# Patient Record
Sex: MALE | Race: BLACK OR AFRICAN AMERICAN | NOT HISPANIC OR LATINO | Employment: FULL TIME | URBAN - METROPOLITAN AREA
[De-identification: names, ages, dates, MRNs, and addresses within clinical notes are randomized per-mention and may not be internally consistent; named-entity substitution may affect disease eponyms.]

---

## 2021-03-15 PROBLEM — G97.1 HEADACHE, SPINAL, POSTOPERATIVE: Status: ACTIVE | Noted: 2021-03-15

## 2021-03-16 PROBLEM — L03.115 CELLULITIS OF RIGHT LOWER EXTREMITY: Status: ACTIVE | Noted: 2021-03-16

## 2021-03-16 PROBLEM — R79.89 ELEVATED TROPONIN: Status: ACTIVE | Noted: 2021-03-16

## 2021-03-16 PROBLEM — E11.9 TYPE II DIABETES MELLITUS: Status: ACTIVE | Noted: 2021-03-16

## 2021-03-16 PROBLEM — I16.0 HYPERTENSIVE URGENCY: Status: ACTIVE | Noted: 2021-03-16

## 2021-03-16 PROBLEM — Z96.651 STATUS POST TOTAL RIGHT KNEE REPLACEMENT: Status: ACTIVE | Noted: 2021-03-16

## 2021-03-16 PROBLEM — E87.6 HYPOKALEMIA: Status: ACTIVE | Noted: 2021-03-16

## 2021-03-17 PROBLEM — G97.1 POSTOPERATIVE SPINAL HEADACHE: Status: ACTIVE | Noted: 2021-03-17

## 2021-03-17 PROBLEM — G97.1 POSTOPERATIVE SPINAL HEADACHE: Status: RESOLVED | Noted: 2021-03-15 | Resolved: 2021-03-17

## 2021-03-17 PROBLEM — E87.6 HYPOKALEMIA: Status: RESOLVED | Noted: 2021-03-16 | Resolved: 2021-03-17

## 2021-03-17 PROBLEM — I16.0 HYPERTENSIVE URGENCY: Status: RESOLVED | Noted: 2021-03-16 | Resolved: 2021-03-17

## 2021-03-17 PROBLEM — R79.89 ELEVATED TROPONIN: Status: RESOLVED | Noted: 2021-03-16 | Resolved: 2021-03-17

## 2022-01-07 ENCOUNTER — CLINICAL SUPPORT (OUTPATIENT)
Dept: PRIMARY CARE CLINIC | Facility: CLINIC | Age: 42
End: 2022-01-07
Payer: MEDICAID

## 2022-01-07 DIAGNOSIS — R05.9 COUGH: Primary | ICD-10-CM

## 2022-01-07 LAB
CTP QC/QA: YES
SARS-COV-2 RDRP RESP QL NAA+PROBE: POSITIVE

## 2022-01-07 PROCEDURE — U0002 COVID-19 LAB TEST NON-CDC: HCPCS | Mod: PBBFAC,PN

## 2022-01-07 NOTE — PROGRESS NOTES
Pt arrived to clinic with his wife. Verified ID using name and . Allergies verified. Pt. Swabbed for COVID 19 as his wife is also positive and symptomatic. Notified to contact his PCP for further instructions as far as treatment.

## 2022-01-12 DIAGNOSIS — U07.1 COVID-19 VIRUS DETECTED: ICD-10-CM

## 2023-06-05 ENCOUNTER — TELEPHONE (OUTPATIENT)
Dept: PAIN MEDICINE | Facility: CLINIC | Age: 43
End: 2023-06-05
Payer: MEDICAID

## 2023-06-05 NOTE — TELEPHONE ENCOUNTER
Spoke with pt. Advised pt that he will come in at 9am on Thursday. All questions answered. Pt verbalized understanding.

## 2023-06-05 NOTE — TELEPHONE ENCOUNTER
----- Message from Jimi Bright sent at 6/5/2023 11:29 AM CDT -----  Regarding: Returning Call  .Type:  Patient Returning Call    Who Called: Self     Who Left Message for Patient: Nurse    Does the patient know what this is regarding?: Appt for tomorrow.    Would the patient rather a call back or a response via [facility]? Call back    Best Call Back Number: [phone]     Additional Information: Called back to confirm appt for Thursday at the [facility] with this provider at 9:45 am. Please advise.

## 2023-06-08 ENCOUNTER — OFFICE VISIT (OUTPATIENT)
Dept: PAIN MEDICINE | Facility: CLINIC | Age: 43
End: 2023-06-08
Payer: MEDICAID

## 2023-06-08 VITALS
HEART RATE: 83 BPM | HEIGHT: 76 IN | DIASTOLIC BLOOD PRESSURE: 111 MMHG | BODY MASS INDEX: 36.51 KG/M2 | SYSTOLIC BLOOD PRESSURE: 179 MMHG | WEIGHT: 299.81 LBS

## 2023-06-08 DIAGNOSIS — M47.816 LUMBAR SPONDYLOSIS: ICD-10-CM

## 2023-06-08 DIAGNOSIS — M54.30 ACUTE SCIATICA: ICD-10-CM

## 2023-06-08 DIAGNOSIS — M51.36 DDD (DEGENERATIVE DISC DISEASE), LUMBAR: Primary | ICD-10-CM

## 2023-06-08 DIAGNOSIS — M54.16 LUMBAR RADICULOPATHY: ICD-10-CM

## 2023-06-08 DIAGNOSIS — S39.012A BACK STRAIN, INITIAL ENCOUNTER: ICD-10-CM

## 2023-06-08 PROBLEM — M51.369 DDD (DEGENERATIVE DISC DISEASE), LUMBAR: Status: ACTIVE | Noted: 2023-06-08

## 2023-06-08 PROCEDURE — 1159F MED LIST DOCD IN RCRD: CPT | Mod: CPTII,,, | Performed by: PAIN MEDICINE

## 2023-06-08 PROCEDURE — 99214 OFFICE O/P EST MOD 30 MIN: CPT | Mod: PBBFAC,PN | Performed by: PAIN MEDICINE

## 2023-06-08 PROCEDURE — 3080F PR MOST RECENT DIASTOLIC BLOOD PRESSURE >= 90 MM HG: ICD-10-PCS | Mod: CPTII,,, | Performed by: PAIN MEDICINE

## 2023-06-08 PROCEDURE — 3080F DIAST BP >= 90 MM HG: CPT | Mod: CPTII,,, | Performed by: PAIN MEDICINE

## 2023-06-08 PROCEDURE — 1159F PR MEDICATION LIST DOCUMENTED IN MEDICAL RECORD: ICD-10-PCS | Mod: CPTII,,, | Performed by: PAIN MEDICINE

## 2023-06-08 PROCEDURE — 99204 PR OFFICE/OUTPT VISIT, NEW, LEVL IV, 45-59 MIN: ICD-10-PCS | Mod: S$PBB,,, | Performed by: PAIN MEDICINE

## 2023-06-08 PROCEDURE — 3077F PR MOST RECENT SYSTOLIC BLOOD PRESSURE >= 140 MM HG: ICD-10-PCS | Mod: CPTII,,, | Performed by: PAIN MEDICINE

## 2023-06-08 PROCEDURE — 99999 PR PBB SHADOW E&M-EST. PATIENT-LVL IV: CPT | Mod: PBBFAC,,, | Performed by: PAIN MEDICINE

## 2023-06-08 PROCEDURE — 3008F PR BODY MASS INDEX (BMI) DOCUMENTED: ICD-10-PCS | Mod: CPTII,,, | Performed by: PAIN MEDICINE

## 2023-06-08 PROCEDURE — 99999 PR PBB SHADOW E&M-EST. PATIENT-LVL IV: ICD-10-PCS | Mod: PBBFAC,,, | Performed by: PAIN MEDICINE

## 2023-06-08 PROCEDURE — 1160F RVW MEDS BY RX/DR IN RCRD: CPT | Mod: CPTII,,, | Performed by: PAIN MEDICINE

## 2023-06-08 PROCEDURE — 99204 OFFICE O/P NEW MOD 45 MIN: CPT | Mod: S$PBB,,, | Performed by: PAIN MEDICINE

## 2023-06-08 PROCEDURE — 1160F PR REVIEW ALL MEDS BY PRESCRIBER/CLIN PHARMACIST DOCUMENTED: ICD-10-PCS | Mod: CPTII,,, | Performed by: PAIN MEDICINE

## 2023-06-08 PROCEDURE — 3008F BODY MASS INDEX DOCD: CPT | Mod: CPTII,,, | Performed by: PAIN MEDICINE

## 2023-06-08 PROCEDURE — 3077F SYST BP >= 140 MM HG: CPT | Mod: CPTII,,, | Performed by: PAIN MEDICINE

## 2023-06-08 RX ORDER — CETIRIZINE HYDROCHLORIDE 10 MG/1
TABLET ORAL
COMMUNITY

## 2023-06-08 RX ORDER — ATORVASTATIN CALCIUM 20 MG/1
20 TABLET, FILM COATED ORAL
COMMUNITY
Start: 2023-03-29

## 2023-06-08 RX ORDER — DEXTROAMPHETAMINE SACCHARATE, AMPHETAMINE ASPARTATE MONOHYDRATE, DEXTROAMPHETAMINE SULFATE AND AMPHETAMINE SULFATE 5; 5; 5; 5 MG/1; MG/1; MG/1; MG/1
20 CAPSULE, EXTENDED RELEASE ORAL EVERY MORNING
COMMUNITY

## 2023-06-08 RX ORDER — METFORMIN HYDROCHLORIDE 500 MG/1
500 TABLET ORAL
COMMUNITY
Start: 2023-01-11

## 2023-06-08 RX ORDER — LIRAGLUTIDE 6 MG/ML
1.8 INJECTION SUBCUTANEOUS
COMMUNITY

## 2023-06-08 RX ORDER — VALSARTAN AND HYDROCHLOROTHIAZIDE 320; 25 MG/1; MG/1
1 TABLET, FILM COATED ORAL
COMMUNITY
Start: 2023-01-11

## 2023-06-08 RX ORDER — METHYLPREDNISOLONE 4 MG/1
TABLET ORAL
Qty: 21 EACH | Refills: 0 | Status: SHIPPED | OUTPATIENT
Start: 2023-06-08 | End: 2023-06-29

## 2023-06-08 RX ORDER — GABAPENTIN 300 MG/1
300 CAPSULE ORAL 3 TIMES DAILY
Qty: 90 CAPSULE | Refills: 11 | Status: SHIPPED | OUTPATIENT
Start: 2023-06-08 | End: 2024-06-07

## 2023-06-08 RX ORDER — AMLODIPINE BESYLATE 10 MG/1
TABLET ORAL
COMMUNITY
End: 2023-06-08 | Stop reason: SDUPTHER

## 2023-06-08 NOTE — PROGRESS NOTES
Subjective:     Patient ID: Faraz Cole is a 42 y.o. male    Chief Complaint: Back Pain (Pt stated he had possibly pulled his back, pt went to ED and was told he has sciatica )      Referred by: Sue Amador NP      HPI:    Initial Encounter (6/8/23):  Faraz Cole is a 42 y.o. male who presents today with acute midline low back pain.  This pain has been present for 2-3 weeks.  Patient lifted a manhole cover while at work and began to have low back pain the next day.  Prior to this he was not having any pain.  Patient does have history of previous episodes of low back pain including sciatica.  Currently, his pain is located the midline lower lumbar spine.  Will radiate to the bilateral lumbar paraspinals, hips, buttocks.  Pain will radiate further into the bilateral inguinal region and genitals.  He denies any associated , weakness, bowel bladder dysfunction.  Does have some intermittent tingling of the toes of his right foot.  Pain is improved with standing and walking.  Typically worse when standing from sitting or changing positions while lying down.   This pain is described in detail below.    Physical Therapy:  No.    Non-pharmacologic Treatment:  Rest helps         TENS?  No    Pain Medications:         Currently taking:  Ibuprofen, gabapentin    Has tried in the past:      Has not tried:  Opioids, Tylenol, Muscle relaxants, TCAs, SNRIs, topical creams    Blood thinners:  None    Interventional Therapies:  None    Relevant Surgeries:  None    Affecting sleep?  Yes    Affecting daily activities? yes    Depressive symptoms? No          SI/HI? No    Work status: Employed    Pain Scores:    Best:       10/10  Worst:     10/10  Usually:   10/10  Today:    10/10    Pain Disability Index  Family/Home Responsibilities:: 10  Recreation:: 10  Social Activity:: 10  Occupation:: 10  Sexual Behavior:: 10  Self Care:: 10  Life-Support Activities:: 10  Pain Disability Index (PDI): 70    Review of Systems    Constitutional:  Negative for activity change, appetite change, chills, fatigue, fever and unexpected weight change.   HENT:  Negative for hearing loss.    Eyes:  Negative for visual disturbance.   Respiratory:  Negative for chest tightness and shortness of breath.    Cardiovascular:  Negative for chest pain.   Gastrointestinal:  Negative for abdominal pain, constipation, diarrhea, nausea and vomiting.   Genitourinary:  Negative for difficulty urinating.   Musculoskeletal:  Positive for back pain, gait problem and myalgias. Negative for neck pain.   Skin:  Negative for rash.   Neurological:  Positive for numbness. Negative for dizziness, weakness, light-headedness and headaches.   Psychiatric/Behavioral:  Positive for sleep disturbance. Negative for hallucinations and suicidal ideas. The patient is not nervous/anxious.      Past Medical History:   Diagnosis Date    Diabetes mellitus     Hypertension     Lumbar spondylosis 6/8/2023    Morbid obesity     Type II diabetes mellitus        Past Surgical History:   Procedure Laterality Date    APPENDECTOMY      CHOLECYSTECTOMY      KNEE ARTHROSCOPY Bilateral     TOTAL KNEE ARTHROPLASTY Right 03/11/2021    Dr. Kennedy Melendez       Social History     Socioeconomic History    Marital status:    Tobacco Use    Smoking status: Former     Types: Cigarettes    Smokeless tobacco: Never   Substance and Sexual Activity    Alcohol use: Not Currently     Comment: socially    Drug use: Never    Sexual activity: Never   Social History Narrative    ** Merged History Encounter **         ** Merged History Encounter **         ** Merged History Encounter **            Review of patient's allergies indicates:   Allergen Reactions    Ace inhibitors Swelling     Throat swelling     Ace inhibitors Swelling, Anaphylaxis and Rash     angioedema  Throat swelling   Angioedema  rash    Ace inhibitors Edema     Angioedema    Ace inhibitors Rash     rash       Current Outpatient Medications  on File Prior to Visit   Medication Sig Dispense Refill    atorvastatin (LIPITOR) 20 MG tablet Take 20 mg by mouth.      cetirizine (ZYRTEC) 10 MG tablet cetirizine 10 mg tablet   TAKE 1 TABLET BY MOUTH ONCE DAILY      dextroamphetamine-amphetamine (ADDERALL XR) 20 MG 24 hr capsule Take 20 mg by mouth every morning.      famotidine (PEPCID) 20 MG tablet Take 1 tablet (20 mg total) by mouth 2 (two) times daily as needed for Heartburn. 60 tablet 0    liraglutide 0.6 mg/0.1 mL, 18 mg/3 mL, subq PNIJ (VICTOZA 2-MATT) 0.6 mg/0.1 mL (18 mg/3 mL) PnIj pen 1.8 mg.      metFORMIN (GLUCOPHAGE) 500 MG tablet Take 500 mg by mouth.      valsartan-hydrochlorothiazide (DIOVAN-HCT) 320-25 mg per tablet Take 1 tablet by mouth.      [DISCONTINUED] acetaminophen (TYLENOL) 500 MG tablet Take 2 tablets (1,000 mg total) by mouth every 8 (eight) hours as needed for Pain. 60 tablet 0    [DISCONTINUED] albuterol (PROVENTIL/VENTOLIN HFA) 90 mcg/actuation inhaler Inhale 1-2 puffs into the lungs every 6 (six) hours as needed for Wheezing or Shortness of Breath. Rescue 18 g 0    [DISCONTINUED] alprazolam (XANAX) 1 MG tablet Take 1 mg by mouth 2 (two) times daily.      [DISCONTINUED] amLODIPine (NORVASC) 10 MG tablet amlodipine 10 mg tablet   TAKE 1 TABLET BY MOUTH ONCE DAILY IN THE EVENING FOR 30 DAYS      [DISCONTINUED] amoxicillin-clavulanate 875-125mg (AUGMENTIN) 875-125 mg per tablet Take 1 tablet by mouth 2 (two) times daily. 14 tablet 0    [DISCONTINUED] aspirin (ECOTRIN) 81 MG EC tablet Take 81 mg by mouth once daily.      [DISCONTINUED] aspirin (ECOTRIN) 81 MG EC tablet Take 81 mg by mouth once daily.      [DISCONTINUED] azithromycin (Z-MATT) 250 MG tablet Take 1 tablet (250 mg total) by mouth once daily. Take first 2 tablets together, then 1 every day until finished. 6 tablet 0    [DISCONTINUED] famotidine (PEPCID) 20 MG tablet Take 20 mg by mouth 2 (two) times daily.      [DISCONTINUED] gabapentin (NEURONTIN) 300 MG capsule Take 1  capsule (300 mg total) by mouth 3 (three) times daily. 90 capsule 11    [DISCONTINUED] hydrochlorothiazide (HYDRODIURIL) 25 MG tablet Take 25 mg by mouth once daily.      [DISCONTINUED] HYDROcodone-acetaminophen (NORCO)  mg per tablet Take 1 tablet by mouth.      [DISCONTINUED] ibuprofen (ADVIL,MOTRIN) 600 MG tablet Take 1 tablet (600 mg total) by mouth every 6 (six) hours as needed for Pain. 20 tablet 0    [DISCONTINUED] ketorolac (TORADOL) 10 mg tablet Take 1 tablet (10 mg total) by mouth every 6 (six) hours. 20 tablet 0    [DISCONTINUED] ketorolac (TORADOL) 10 mg tablet Take 1 tablet (10 mg total) by mouth every 6 (six) hours as needed for Pain. 15 tablet 0    [DISCONTINUED] liraglutide 0.6 mg/0.1 mL, 18 mg/3 mL, subq PNIJ (VICTOZA 2-MATT) 0.6 mg/0.1 mL (18 mg/3 mL) PnIj pen Inject 1.8 mg into the skin once daily.      [DISCONTINUED] losartan (COZAAR) 100 MG tablet Take 100 mg by mouth once daily.      [DISCONTINUED] losartan (COZAAR) 100 MG tablet Take 100 mg by mouth once daily.      [DISCONTINUED] meloxicam (MOBIC) 15 MG tablet Take 1 tablet (15 mg total) by mouth once daily. 10 tablet 0    [DISCONTINUED] metformin (GLUCOPHAGE) 1000 MG tablet Take 1,000 mg by mouth 2 (two) times daily with meals.      [DISCONTINUED] metFORMIN (GLUCOPHAGE) 1000 MG tablet Take 1,000 mg by mouth 2 (two) times daily with meals.      [DISCONTINUED] naproxen (NAPROSYN) 500 MG tablet Take 1 tablet (500 mg total) by mouth 2 (two) times daily with meals. 60 tablet 0    [DISCONTINUED] nebivoloL (BYSTOLIC) 10 MG Tab Take 10 mg by mouth once daily.      [DISCONTINUED] ondansetron (ZOFRAN) 4 MG tablet Take 1 tablet (4 mg total) by mouth every 6 (six) hours. 12 tablet 0    [DISCONTINUED] ondansetron (ZOFRAN) 4 MG tablet Take 1 tablet (4 mg total) by mouth every 6 (six) hours. 12 tablet 0    [DISCONTINUED] polyethylene glycol (GLYCOLAX) 17 gram PwPk Take 17 g by mouth once daily. 20 each 0    [DISCONTINUED] prochlorperazine (COMPAZINE)  "10 MG tablet Take 1 tablet (10 mg total) by mouth 3 (three) times daily as needed (As needed for intractable headaches.). 20 tablet 0    [DISCONTINUED] silver sulfADIAZINE 1% (SILVADENE) 1 % cream Apply topically 2 (two) times daily. 30 g 0    [DISCONTINUED] traMADoL (ULTRAM) 50 mg tablet Take 1 tablet (50 mg total) by mouth every 12 (twelve) hours as needed for Pain. 5 tablet 0    [DISCONTINUED] valsartan-hydrochlorothiazide (DIOVAN-HCT) 160-12.5 mg per tablet Take 1 tablet by mouth once daily.       No current facility-administered medications on file prior to visit.       Objective:      BP (!) 179/111   Pulse 83   Ht 6' 4" (1.93 m)   Wt 136 kg (299 lb 13.2 oz)   BMI 36.50 kg/m²     Exam:  GEN:  Well developed, well nourished.  No acute distress.  Normal pain behavior.  HEENT:  No trauma.  Mucous membranes moist.  Nares patent bilaterally.  PSYCH: Normal affect. Thought content appropriate.  CHEST:  Breathing symmetric.  No audible wheezing.  ABD: Soft, non-distended.  SKIN:  Warm, pink, dry.  No rash on exposed areas.    EXT:  No cyanosis, clubbing, or edema.  No color change or changes in nail or hair growth.  NEURO/MUSCULOSKELETAL:  Fully alert, oriented, and appropriate. Speech normal marisol. No cranial nerve deficits.   Gait:  Antalgic.  No trendelenburg sign bilaterally.   Motor Strength:  5/5 motor strength throughout lower extremities.   Sensory:  No sensory deficit in the lower extremities.   Reflexes:  1+ and symmetric throughout.  Downgoing Babinski's bilaterally.  No clonus or spasticity.  L-Spine:  Limited ROM with pain on flexion and extension.  Positive pain with axial/facet loading bilaterally.  Negative SLR bilaterally.   No TTP over lumbar paraspinals, bilateral SI joints, hips, piriformis muscles, or GTB.        Imaging:    Narrative & Impression    EXAMINATION:  XR LUMBAR SPINE AP AND LATERAL     CLINICAL HISTORY:  low back pain;     TECHNIQUE:  AP, lateral and spot images were performed " of the lumbar spine.     COMPARISON:  12/27/2018.     FINDINGS:  There are 5 non-rib-bearing lumbar spine vertebrae.  There is no acute fracture or subluxation the lumbar spine.  The vertebral body heights are maintained.  There is disc height loss at L5-S1.  There are right upper quadrant surgical clips.  Remaining visualized osseous structures intact.     Impression:     No acute osseous abnormality of the lumbar spine.        Electronically signed by: Luis Carlson  Date:                                            06/01/2023  Time:                                           17:28       Assessment:       Encounter Diagnoses   Name Primary?    Back strain, initial encounter     Acute sciatica     DDD (degenerative disc disease), lumbar Yes    Lumbar spondylosis     Lumbar radiculopathy          Plan:       Faraz was seen today for back pain.    Diagnoses and all orders for this visit:    DDD (degenerative disc disease), lumbar  -     gabapentin (NEURONTIN) 300 MG capsule; Take 1 capsule (300 mg total) by mouth 3 (three) times daily.  -     methylPREDNISolone (MEDROL DOSEPACK) 4 mg tablet; use as directed  -     Ambulatory referral/consult to Physical/Occupational Therapy; Future    Back strain, initial encounter  -     Ambulatory referral/consult to Pain Clinic    Acute sciatica    Lumbar spondylosis  -     gabapentin (NEURONTIN) 300 MG capsule; Take 1 capsule (300 mg total) by mouth 3 (three) times daily.  -     methylPREDNISolone (MEDROL DOSEPACK) 4 mg tablet; use as directed  -     Ambulatory referral/consult to Physical/Occupational Therapy; Future    Lumbar radiculopathy  -     gabapentin (NEURONTIN) 300 MG capsule; Take 1 capsule (300 mg total) by mouth 3 (three) times daily.  -     methylPREDNISolone (MEDROL DOSEPACK) 4 mg tablet; use as directed  -     Ambulatory referral/consult to Physical/Occupational Therapy; Future        Faraz Cole is a 42 y.o. male with acute midline low back pain.  Pain  radiating to hip/buttocks and into the inguinal region/genitals.  Not having overt loss of control of bowels or bladder.  No saddle anesthesia or lower extremity weakness.  Does have some paresthesias in the right toes.  This may indicate lumbar radicular pain.  I suspect the majority of his pain is likely related to acute intervertebral disc abnormality with associated discogenic pain.  May have some degree of somatic referred pain into the inguinal/genital region.    Pertinent imaging studies reviewed by me. Imaging results were discussed with patient.  Medrol Dosepak.  Gabapentin 300 milligrams t.i.d..  Refer to PT for ROM, strengthening, stretching and HEP.  Return to clinic in 8 weeks or sooner if needed.  At that time we will discuss efficacy of medications and physical therapy/home exercise program.  May consider lumbar MRI and possibly interventional procedures if pain persists or worsens.            This note was created by combination of typed  and M-Modal dictation. Transcription and phonetic errors may be present.  If there are any questions, please contact me.

## 2023-07-27 ENCOUNTER — TELEPHONE (OUTPATIENT)
Dept: PAIN MEDICINE | Facility: CLINIC | Age: 43
End: 2023-07-27
Payer: MEDICAID

## 2023-07-27 NOTE — TELEPHONE ENCOUNTER
Spoke with patient. Discussed with patient that he is scheduled to begin therapy on 7/31. The follow up appointment with Dr Cohen needed to be rescheduled since the appointment was to discuss the therapy visits and determine how he would proceed with treatment. Clinic visit rescheduled. Verbalized understanding. No further issues discussed.

## 2023-07-27 NOTE — TELEPHONE ENCOUNTER
----- Message from Ara Teixeira sent at 7/27/2023  9:09 AM CDT -----  Contact: Patient  Type:  Patient Call          Who Called: Patient         Does the patient know what this is regarding?: Requesting a call back pt would like to discuss physical therapy he said that he never received a call ;please advise           Would the patient rather a call back or a response via [facility]?call           Best Call Back Number: [phone] (home)              Additional Information:

## 2023-12-11 ENCOUNTER — TELEPHONE (OUTPATIENT)
Dept: SURGERY | Facility: CLINIC | Age: 43
End: 2023-12-11
Payer: MEDICAID

## 2023-12-11 ENCOUNTER — PATIENT MESSAGE (OUTPATIENT)
Dept: SURGERY | Facility: CLINIC | Age: 43
End: 2023-12-11
Payer: MEDICAID

## 2023-12-11 DIAGNOSIS — Z12.11 SCREENING FOR COLON CANCER: Primary | ICD-10-CM

## 2023-12-11 RX ORDER — SODIUM CHLORIDE 0.9 % (FLUSH) 0.9 %
3 SYRINGE (ML) INJECTION
Status: CANCELLED | OUTPATIENT
Start: 2023-12-11

## 2023-12-11 NOTE — TELEPHONE ENCOUNTER
The patient has been advised the Colonoscopy Prep Kit will be ordered from the patient's verified preferred pharmacy on file. The medication can  be picked up by the patient, or the patient's designated representative.The patient was further explained the Colonoscopy Prep instructions will be mailed to the patient verified mailing address on file, or put onto the [employer] portal, whichever method of delivery the patient prefers.Additionally this patient was informed,not to follow the instructions that comes with the bowel prep medication. However, the patient was instructed to please follow the Colonoscopy Bowel Prep instructions that's being provided by the . The patient was asked to please to follow the Colonoscopy Prep instructions being provided as precisely,and  meticulously as possible.The patient was advised you  will receive a follow up phone call to summarize the Colonoscopy Prep instructions prior to the scheduled Colonoscopy procedure date. At this time the patient will be given an opportunity to ask any questions regarding the Colonoscopy procedure, and it's associated Bowel Prep instructions.

## 2023-12-11 NOTE — TELEPHONE ENCOUNTER
Called patient in reference to a referral to Colorectal Surgery for colon cancer screening. Patient verbally consented to a Colonoscopy and requested to be scheduled for a Colonoscopy on 12/15/2023 Patient was advised a designated  is required on the day of the Colonoscopy to drive the patient home and the  must be at least. 18 years old.Colonoscopy Prep instructions were thoroughly explained and discussed with the patient.It was emphasized, and reiterated to the patient, to please not to follow the bowel prep instructions that comes with the bowel prep package.However, to please follow the prep instructions that will be received in the mail,or via the [email or web address] portal, or by both modes of delivery, which ever method of delivery the patient prefers,from the [facility]   Patient acknowledges understanding Prep instructions as explained and discussed on the phone.. Patient was advised the Colonoscopy Prep instructions discussed and explained on the phone,are being mailed out to the patient's verified address on file,or put onto the [email or web address] portal,or both methods of delivery, whichever the patient prefers. Patient's address on file was verified with the patient for accuracy of mailing. Patient's medications on file was reviewed with the patient for accuracy of information. Patient denies taking  any other medications other than those listed and verified on medication profile.Patient was explained the Colonoscopy will be performed here at [facility]. Patient was further explained the Pre-Op will call one day prior to the procedure date, to discuss Pre-Op instructions;and what time to report for the Colonoscopy. The patient was given the opportunity to ask any questions about the Colonoscopy. No further issues were discussed.

## 2023-12-12 RX ORDER — SODIUM, POTASSIUM,MAG SULFATES 17.5-3.13G
1 SOLUTION, RECONSTITUTED, ORAL ORAL DAILY
Qty: 1 KIT | Refills: 0 | Status: SHIPPED | OUTPATIENT
Start: 2023-12-12 | End: 2023-12-14

## 2023-12-21 ENCOUNTER — TELEPHONE (OUTPATIENT)
Dept: UROLOGY | Facility: CLINIC | Age: 43
End: 2023-12-21
Payer: MEDICAID

## 2023-12-21 NOTE — TELEPHONE ENCOUNTER
Offered and scheduled patient for first available appt.  Answered all of patient questions.  Patient verbalized understanding.    WILLIE Roca

## 2023-12-21 NOTE — TELEPHONE ENCOUNTER
----- Message from Ruthie Jackson sent at 12/21/2023  8:36 AM CST -----  Contact: pt @ [phone]  Faraz Cole calling regarding Appointment Access  (message) for #pt is calling to get np appt for check up, asking for call back

## 2024-02-07 PROBLEM — M79.10 MYALGIA: Status: ACTIVE | Noted: 2024-02-07

## 2024-02-07 PROBLEM — M54.2 NECK PAIN: Status: ACTIVE | Noted: 2024-02-07

## 2024-02-07 PROBLEM — I10 HYPERTENSION: Status: ACTIVE | Noted: 2024-02-07

## 2024-03-01 ENCOUNTER — OFFICE VISIT (OUTPATIENT)
Dept: UROLOGY | Facility: CLINIC | Age: 44
End: 2024-03-01
Payer: MEDICAID

## 2024-03-01 VITALS
WEIGHT: 315 LBS | BODY MASS INDEX: 38.36 KG/M2 | DIASTOLIC BLOOD PRESSURE: 120 MMHG | SYSTOLIC BLOOD PRESSURE: 196 MMHG | HEIGHT: 76 IN | HEART RATE: 78 BPM

## 2024-03-01 DIAGNOSIS — N45.1 LEFT EPIDIDYMITIS: ICD-10-CM

## 2024-03-01 DIAGNOSIS — N50.811 PAIN IN BOTH TESTICLES: Primary | ICD-10-CM

## 2024-03-01 DIAGNOSIS — N50.812 PAIN IN BOTH TESTICLES: Primary | ICD-10-CM

## 2024-03-01 LAB
BILIRUB SERPL-MCNC: NEGATIVE MG/DL
BLOOD URINE, POC: NEGATIVE
CLARITY, POC UA: CLEAR
COLOR, POC UA: YELLOW
GLUCOSE UR QL STRIP: NEGATIVE
KETONES UR QL STRIP: NEGATIVE
LEUKOCYTE ESTERASE URINE, POC: NEGATIVE
NITRITE, POC UA: NEGATIVE
PH, POC UA: 5
PROTEIN, POC: NEGATIVE
SPECIFIC GRAVITY, POC UA: 1.01
UROBILINOGEN, POC UA: NORMAL

## 2024-03-01 PROCEDURE — 3077F SYST BP >= 140 MM HG: CPT | Mod: CPTII,,, | Performed by: UROLOGY

## 2024-03-01 PROCEDURE — 81002 URINALYSIS NONAUTO W/O SCOPE: CPT | Mod: PBBFAC,PN | Performed by: UROLOGY

## 2024-03-01 PROCEDURE — 99999PBSHW POCT URINE DIPSTICK WITHOUT MICROSCOPE: Mod: PBBFAC,,,

## 2024-03-01 PROCEDURE — 1159F MED LIST DOCD IN RCRD: CPT | Mod: CPTII,,, | Performed by: UROLOGY

## 2024-03-01 PROCEDURE — 1160F RVW MEDS BY RX/DR IN RCRD: CPT | Mod: CPTII,,, | Performed by: UROLOGY

## 2024-03-01 PROCEDURE — 3008F BODY MASS INDEX DOCD: CPT | Mod: CPTII,,, | Performed by: UROLOGY

## 2024-03-01 PROCEDURE — 3080F DIAST BP >= 90 MM HG: CPT | Mod: CPTII,,, | Performed by: UROLOGY

## 2024-03-01 PROCEDURE — 99204 OFFICE O/P NEW MOD 45 MIN: CPT | Mod: S$PBB,,, | Performed by: UROLOGY

## 2024-03-01 PROCEDURE — 99999 PR PBB SHADOW E&M-EST. PATIENT-LVL III: CPT | Mod: PBBFAC,,, | Performed by: UROLOGY

## 2024-03-01 PROCEDURE — 99213 OFFICE O/P EST LOW 20 MIN: CPT | Mod: PBBFAC,PN | Performed by: UROLOGY

## 2024-03-01 RX ORDER — SULFAMETHOXAZOLE AND TRIMETHOPRIM 800; 160 MG/1; MG/1
1 TABLET ORAL 2 TIMES DAILY
Qty: 28 TABLET | Refills: 0 | Status: SHIPPED | OUTPATIENT
Start: 2024-03-01 | End: 2024-03-15

## 2024-03-01 NOTE — LETTER
March 1, 2024      [facility] [address]  [address]  [facility] [address]  Phone: [phone]  Fax: [phone]       Patient: Faraz Cole   YOB: 1980  Date of Visit: 03/01/2024    To Whom It May Concern:    Adrian Cole  was at [facility] on 03/01/2024. The patient may return to work/school on 03/02/2024 with no restrictions. If you have any questions or concerns, or if I can be of further assistance, please do not hesitate to contact me.    Sincerely,    Domingo Rushing MD

## 2024-03-01 NOTE — PROGRESS NOTES
"Subjective:      Faraz Cole is a 43 y.o. male who was self-referred for evaluation of orchalgia.      He reports bilateral testicular pain, left > right. He has had this on and off for about 20 years. He states pain has been more severe recently, for about 1 month, which prompted today's visit. He's had some relief with supportive underwear.    He had some unknown surgery on the left side when he as in college.    The following portions of the patient's history were reviewed and updated as appropriate: allergies, current medications, past family history, past medical history, past social history, past surgical history and problem list.    Review of Systems  Constitutional: no fever or chills  ENT: no nasal congestion or sore throat  Respiratory: no cough or shortness of breath  Cardiovascular: no chest pain or palpitations  Gastrointestinal: no nausea or vomiting, tolerating diet  Genitourinary: as per HPI  Hematologic/Lymphatic: no easy bruising or lymphadenopathy  Musculoskeletal: no arthralgias or myalgias  Neurological: no seizures or tremors  Behavioral/Psych: no auditory or visual hallucinations     Objective:   Vitals: BP (!) 196/120 (BP Location: Right arm, Patient Position: Sitting, BP Method: Large (Automatic)) Comment: Reports he did not put his new clonidine patch this morning after taking the old one off.  Pulse 78   Ht 6' 4" (1.93 m)   Wt (!) 143.2 kg (315 lb 11.2 oz)   BMI 38.43 kg/m²     Physical Exam   General: alert and oriented, no acute distress  Head: normocephalic, atraumatic  Neck: supple, no lymphadenopathy, normal ROM, no masses  Respiratory: Symmetric expansion, non-labored breathing  Genitourinary:   Penis: normal, no lesions, patent orthotopic meatus, no plaques  Scrotum: no rashes or skin changes;   Testes: descended bilaterally, no masses, very TTP on left epididymis with mild swelling  Neuro: alert and oriented x3, no gross deficits  Psych: normal judgment and insight, normal " mood/affect, and non-anxious    Lab Review   Urinalysis demonstrates negative for all components  Lab Results   Component Value Date    WBC 5.38 02/07/2024    HGB 15.3 02/07/2024    HCT 44.1 02/07/2024    MCV 86 02/07/2024     02/07/2024     Lab Results   Component Value Date    CREATININE 1.1 02/07/2024    BUN 12 02/07/2024     Assessment:     1. Pain in both testicles    2. Left epididymitis        Plan:   Bactrim x 2 weeks  Discussed conservative measures for relieving testicular pain - scrotal support, ibuprofen, scrotal elevation, ice packs   FU PRN

## 2024-04-16 ENCOUNTER — TELEPHONE (OUTPATIENT)
Dept: OTOLARYNGOLOGY | Facility: CLINIC | Age: 44
End: 2024-04-16
Payer: MEDICAID

## 2024-04-16 NOTE — TELEPHONE ENCOUNTER
----- Message from Bethel Milton sent at 4/16/2024 11:35 AM CDT -----  Contact: [phone]  Pt is calling to schedule an ER follow up for  K11.5 (ICD-10-CM) - Sialolithiasis of submandibular gland. Please call back to further assist.

## 2024-04-16 NOTE — TELEPHONE ENCOUNTER
Please review advise if we need to work pt in sooner at [facility]. Pt currently scheduled for 6/21/24. ThanksRylie

## 2024-04-16 NOTE — TELEPHONE ENCOUNTER
He should be treated with antibiotics then routine salivary care.  No apparent emergency.  If there is, he should go to [facility] ER.    SALIVARY CARE    Drink plenty of fluids suck on sour candies and massage the affected gland routinely to maintain flow of saliva and prevent buildup and swelling and pain.  Heat to the affected gland may also be beneficial when needed. If swelling and pain persist for several hours to a day or more antibiotics may prove necessary.

## 2024-04-17 NOTE — TELEPHONE ENCOUNTER
Called pt and advised of recommendations.Pt asked that I also send him the information to his [facility]. Advised is on the wait list for the [place] location and can call Fridays to see if any same-day cancellations have occurred.  Pt verbalized understanding. Thanks, Rylie

## 2024-05-15 ENCOUNTER — TELEPHONE (OUTPATIENT)
Dept: OTOLARYNGOLOGY | Facility: CLINIC | Age: 44
End: 2024-05-15
Payer: MEDICAID

## 2024-05-15 ENCOUNTER — NURSE TRIAGE (OUTPATIENT)
Dept: ADMINISTRATIVE | Facility: CLINIC | Age: 44
End: 2024-05-15
Payer: MEDICAID

## 2024-05-15 NOTE — TELEPHONE ENCOUNTER
----- Message from Bianca Corbin sent at 5/15/2024  7:56 AM CDT -----  Contact: [phone]  Patient called, would like to get a call back from Dr Howe's nurse in regards his upcoming appointment on 06/21/24. Please call and advise. Thank you.

## 2024-05-15 NOTE — TELEPHONE ENCOUNTER
"Called pt. Apologized to pt and let him know that he does have the earliest available appt with Dr. Howe. Advised pt that he can call the clinic on Friday mornings to see if anyone cancels that day. Pt states that he had an issue during sleep that he woke up and "breathed in acid reflux into the lungs". States that he started "coughing and choking" and "was spitting foam". Pt states that his wife wanted him to go to the ER at that time, but he did not want to go. Pt wanted to know if the salivary stones could have caused that? Advised him that he would have to discuss that with the provider. Pt was advised if he has issues with choking and breathing concerns to go to the ER for evaluation. Pt verbalized understanding. ThanksRylie  "

## 2024-05-15 NOTE — TELEPHONE ENCOUNTER
----- Message from Inna Baez sent at 5/15/2024  2:52 PM CDT -----  Consult/Advisory    Name Of Caller:      Contact Preference:[phone]    Nature of call: Ptn stated he was seen in the ER he stated he was told he needs to be seen sooner please call ptn to assist

## 2024-05-15 NOTE — TELEPHONE ENCOUNTER
[place]    PCP:  Dr. Rigo Staples    He reports seen at the ED on 4/16 for GERD.  He reports appt on 6/21.  He reports had an episode last night to where he had acid reflux in his lungs, SOB, gasping, nausea, diarrhea, and foam/chocolate candy in his mouth.  Denies fever, abdominal pain, SOB, CP, difficulty swallowing, and constipation.  Per protocol, care advised is go to ED/UCC now (or to office with PCP approval).  NT spoke with SASHA, Deb Phillips PA-C for approval to go to PCP office.  SASHA recommends that it is okay for him to go to his PCP's office since it was a 1 time episode that resolved and no residual symptoms today.  Pt advised okay to go to PCP's office.  Pts PCP is non-[facility] Provider therefore advised pt to call PCP for appt or be seen by [facility]/UCC/ED if unable to see PCP.  Message also routed to ENT for earlier appt if possible.  Pt VU.  Advised pt to avoid chocolate, peppermints/mint, alcohol, fatty or greasy foods, not to eat just prior to bedtime or if he does then he needs to sit up for a couple of hours until his food digests, prop his head up on pillows, and he can place 4 in blocks under the head of his bed to raise the head of the bed up.  Advised to call for worsening/questions/concerns.  VU.    Reason for Disposition   Drinking very little and dehydration suspected (e.g., no urine > 12 hours, very dry mouth, very lightheaded)    Additional Information   Negative: Shock suspected (e.g., cold/pale/clammy skin, too weak to stand, low BP, rapid pulse)   Negative: Sounds like a life-threatening emergency to the triager   Negative: Unable to walk, or can only walk with assistance (e.g., requires support)   Negative: Difficulty breathing   Negative: Insulin-dependent diabetes (Type I) and glucose > 400 mg/dL (22 mmol/L)    Protocols used: Nausea-A-OH

## 2024-06-05 ENCOUNTER — NURSE TRIAGE (OUTPATIENT)
Dept: ADMINISTRATIVE | Facility: CLINIC | Age: 44
End: 2024-06-05
Payer: MEDICAID

## 2024-06-05 NOTE — TELEPHONE ENCOUNTER
Spoke with pt who reports having pain, and difficulty with swallowing that started today. Reports lump noted to side of neck. States only able to swallow liquids. Denies SOB. Advised to be seen in ED. Verbalized understanding.      Reason for Disposition   SEVERE difficulty swallowing (e.g., drooling or spitting, can't swallow water)    Additional Information   Negative: [1] Severe difficulty swallowing (e.g., drooling or spitting) AND [2] started suddenly after taking a medicine or allergic food   Negative: Wheezing, stridor, hoarseness, or difficulty breathing   Negative: [1] Swollen tongue AND [2] sudden onset   Negative: Sounds like a life-threatening emergency to the triager    Protocols used: Swallowing Difficulty-A-AH

## 2024-06-20 ENCOUNTER — OFFICE VISIT (OUTPATIENT)
Dept: OTOLARYNGOLOGY | Facility: CLINIC | Age: 44
End: 2024-06-20
Payer: MEDICAID

## 2024-06-20 VITALS
BODY MASS INDEX: 38.25 KG/M2 | DIASTOLIC BLOOD PRESSURE: 114 MMHG | HEART RATE: 86 BPM | SYSTOLIC BLOOD PRESSURE: 185 MMHG | HEIGHT: 76 IN | OXYGEN SATURATION: 98 % | WEIGHT: 314.06 LBS

## 2024-06-20 DIAGNOSIS — K11.5 SIALOLITHIASIS OF SUBMANDIBULAR GLAND: ICD-10-CM

## 2024-06-20 DIAGNOSIS — K11.20 SIALADENITIS: ICD-10-CM

## 2024-06-20 PROCEDURE — 1159F MED LIST DOCD IN RCRD: CPT | Mod: CPTII,,, | Performed by: OTOLARYNGOLOGY

## 2024-06-20 PROCEDURE — 3080F DIAST BP >= 90 MM HG: CPT | Mod: CPTII,,, | Performed by: OTOLARYNGOLOGY

## 2024-06-20 PROCEDURE — 3077F SYST BP >= 140 MM HG: CPT | Mod: CPTII,,, | Performed by: OTOLARYNGOLOGY

## 2024-06-20 PROCEDURE — 99215 OFFICE O/P EST HI 40 MIN: CPT | Mod: PBBFAC,PN | Performed by: OTOLARYNGOLOGY

## 2024-06-20 PROCEDURE — 99999 PR PBB SHADOW E&M-EST. PATIENT-LVL V: CPT | Mod: PBBFAC,,, | Performed by: OTOLARYNGOLOGY

## 2024-06-20 PROCEDURE — 3008F BODY MASS INDEX DOCD: CPT | Mod: CPTII,,, | Performed by: OTOLARYNGOLOGY

## 2024-06-20 PROCEDURE — 99204 OFFICE O/P NEW MOD 45 MIN: CPT | Mod: S$PBB,,, | Performed by: OTOLARYNGOLOGY

## 2024-06-20 PROCEDURE — 1160F RVW MEDS BY RX/DR IN RCRD: CPT | Mod: CPTII,,, | Performed by: OTOLARYNGOLOGY

## 2024-06-20 RX ORDER — FUROSEMIDE 20 MG/1
20 TABLET ORAL DAILY PRN
COMMUNITY

## 2024-06-20 RX ORDER — ALBUTEROL SULFATE 90 UG/1
1 AEROSOL, METERED RESPIRATORY (INHALATION) EVERY 4 HOURS PRN
COMMUNITY

## 2024-06-20 RX ORDER — CLONIDINE 0.1 MG/24H
1 PATCH, EXTENDED RELEASE TRANSDERMAL
COMMUNITY

## 2024-06-20 RX ORDER — AMOXICILLIN AND CLAVULANATE POTASSIUM 875; 125 MG/1; MG/1
1 TABLET, FILM COATED ORAL 2 TIMES DAILY
Qty: 20 TABLET | Refills: 1 | Status: SHIPPED | OUTPATIENT
Start: 2024-06-20

## 2024-06-20 RX ORDER — HYDRALAZINE HYDROCHLORIDE 25 MG/1
25 TABLET, FILM COATED ORAL 3 TIMES DAILY
COMMUNITY

## 2024-06-20 RX ORDER — DEXTROAMPHETAMINE SACCHARATE, AMPHETAMINE ASPARTATE, DEXTROAMPHETAMINE SULFATE AND AMPHETAMINE SULFATE 5; 5; 5; 5 MG/1; MG/1; MG/1; MG/1
1 TABLET ORAL 2 TIMES DAILY
COMMUNITY
End: 2024-06-20 | Stop reason: DRUGHIGH

## 2024-06-20 RX ORDER — SILDENAFIL 100 MG/1
100 TABLET, FILM COATED ORAL DAILY PRN
COMMUNITY

## 2024-06-20 RX ORDER — SUMATRIPTAN SUCCINATE 25 MG/1
TABLET ORAL
COMMUNITY

## 2024-06-20 RX ORDER — SEMAGLUTIDE 2.68 MG/ML
2 INJECTION, SOLUTION SUBCUTANEOUS
COMMUNITY
Start: 2024-05-28

## 2024-06-20 RX ORDER — CLINDAMYCIN PHOSPHATE 11.9 MG/ML
SOLUTION TOPICAL
COMMUNITY

## 2024-06-20 RX ORDER — ASPIRIN 81 MG/1
TABLET ORAL
COMMUNITY

## 2024-06-20 RX ORDER — GABAPENTIN 100 MG/1
100 CAPSULE ORAL EVERY 12 HOURS
COMMUNITY

## 2024-06-20 RX ORDER — FLUTICASONE PROPIONATE 50 MCG
1 SPRAY, SUSPENSION (ML) NASAL 2 TIMES DAILY
COMMUNITY

## 2024-06-20 NOTE — PROGRESS NOTES
[facility]    Subjective:      Patient: Faraz Cole Patient PCP: Rigo Staples MD         :  1980     Sex:  male      MRN:  [id number]          Date of Visit: 2024      Chief Complaint: sialolithiasis of submandibular gland, Neck Swelling, and Dysphagia      Patient ID: Faraz Cole is a 43 y.o. male     Patient is a  former smoker with a past medical history of Diabetes with hemoglobin A1c of 6.2%, class 2 obesity, HLD, and HTN referred to me by Dr. Hema Funez in consultation for  sialolithiasis and sialadenitis with symptoms of neck swelling and difficulty swallowing.  Treated with clindamycin.  CT maxillofacial with contrast 2024 and CT soft tissue neck   Without contrast 2024 images reviewed both show inflammatory changes without abscess formation of the right submandibular gland. The contrast-enhanced study does show what appears to be ductal dilation and increased size and enhancement without a distinct mass.  There is a slight area hyperdensity within the gland on the maxillofacial scan which may represent a small stone but there was no clearly identifiable large/obstructive stone on either image series.    Patient reports chronic tenderness of the right submandibular gland and floor of mouth.  Intermittent swelling especially with eating and swallowing.  No fevers or chills.  Occasional foul taste.  No active or known dental infection.  No dental tenderness.  Symptoms ongoing for 6 months but more significant with repeated trips to the ER in the last 2 months.     2024      Labs:  WBC   Date Value Ref Range Status   2024 6.01 3.90 - 12.70 K/uL Final     Hemoglobin   Date Value Ref Range Status   2024 15.0 14.0 - 18.0 g/dL Final     Platelets   Date Value Ref Range Status   2024 193 150 - 450 K/uL Final     Creatinine   Date Value Ref Range Status   2024 1.2 0.5 - 1.4 mg/dL Final     Hemoglobin A1C   Date Value Ref  Range Status   03/16/2021 6.2 (H) 4.0 - 5.6 % Final     Comment:     ADA Screening Guidelines:  5.7-6.4%  Consistent with prediabetes  >or=6.5%  Consistent with diabetes    High levels of fetal hemoglobin interfere with the HbA1C  assay. Heterozygous hemoglobin variants (HbS, HgC, etc)do  not significantly interfere with this assay.   However, presence of multiple variants may affect accuracy.         Past Medical History  He has a past medical history of Diabetes mellitus, Hypertension, Lumbar spondylosis, Morbid obesity, and Type II diabetes mellitus.    Family / Surgical / Social History  His family history includes Diabetes in his father; Hypertension in his father and mother.    Past Surgical History:   Procedure Laterality Date    APPENDECTOMY      CHOLECYSTECTOMY      COLONOSCOPY N/A 12/15/2023    Procedure: COLONOSCOPY;  Surgeon: Kavita Manriquez MD;  Location: [facility];  Service: Gastroenterology;  Laterality: N/A;    COLONOSCOPY W/ POLYPECTOMY  12/15/2023    KNEE ARTHROSCOPY Bilateral     TOTAL KNEE ARTHROPLASTY Right 03/11/2021    Dr. Kennedy Melendez       Social History     Tobacco Use    Smoking status: Former     Types: Cigarettes    Smokeless tobacco: Never   Substance and Sexual Activity    Alcohol use: Not Currently     Comment: socially    Drug use: Never    Sexual activity: Never       Medications  He has a current medication list which includes the following prescription(s): albuterol, aspirin, atorvastatin, cetirizine, clindamycin, clonidine 0.1 mg/24 hr td ptwk, dextroamphetamine-amphetamine, famotidine, fluticasone propionate, furosemide, gabapentin, gabapentin, hydralazine, linaclotide, metformin, ozempic, sildenafil, sumatriptan, valsartan-hydrochlorothiazide, clindamycin, dextroamphetamine-amphetamine, dextromethorphan-guaifenesin  mg/5 ml, victoza 2-anton, naproxen, and tramadol.      Allergies  Review of patient's allergies indicates:   Allergen Reactions    Ace inhibitors Swelling  "    Throat swelling     Ace inhibitors Swelling, Anaphylaxis and Rash     angioedema  Throat swelling   Angioedema  rash    Ace inhibitors Edema     Angioedema    Ace inhibitors Rash     rash       All medications, allergies, and past history have been reviewed.    Objective:      Vitals:      4/16/2024     6:26 AM 6/6/2024     5:02 AM 6/20/2024     2:20 PM   Vitals - 1 value per visit   SYSTOLIC 216  195 185   DIASTOLIC 126  120 114   Pulse 76 84 86   Temp 97.6 °F (36.4 °C) 98 °F (36.7 °C)    Resp 18 20    SPO2 96 % 97 % 98 %   Weight (lb) 300 302 314.05   Weight (kg) 136.079 136.986 142.45   Height 6' 4" (1.93 m)  6' 4" (1.93 m)   BMI (Calculated) 36.5  38.2   Pain Score   Zero       Significant value       Body surface area is 2.76 meters squared.    Physical Exam:    GENERAL  APPEARANCE -  alert, appears stated age, cooperative, and moderately obese  BARRIER(S) TO COMMUNICATION -  none VOICE - appropriate for age and gender    INTEGUMENTARY  no suspicious head and neck lesions    HEENT  HEAD: Normocephalic, without obvious abnormality, atraumatic  FACE: INSPECTION - Symmetric, no signs of trauma, no suspicious lesion(s)      STRENGTH - facial symmetry intact     PALPATION -  No masses     SALIVARY GLANDS -   Right submandibular gland tenderness.  Floor of mouth soft.  No drainage from [place]'s papilla.  No palpable stone transorally or transcervically.  No palpable mass or gross asymmetry.   NECK/THYROID: normal atraumatic, no neck masses, normal thyroid, no jvd    EYES  Normal occular alignment and mobility with no visible nystagmus at rest    EARS/NOSE/MOUTH/THROAT  EARS  PINNAE AND EXTERNAL EARS - no suspicious lesion OTOSCOPIC EXAM (surgical microscopy was not used for visualization/instrumentation): EAR EXAM - Normal ear canals, tympanic membranes and mobility, and middle ear spaces bilaterally.  HEARING - grossly intact to voice/finger rub    NOSE AND SINUSES  EXTERNAL NOSE - Grossly normal for age/sex  " SEPTUM - normal/no obstruction on anterior exam without decongestion TURBINATES - within normal limits MUCOSA - within normal limits     MOUTH AND THROAT   ORAL CAVITY, LIPS, TEETH, GUMS & TONGUE - moist, no suspicious lesions  OROPHARYNX /TONSILS/PHARYNGEAL WALLS/HYPOPHARYNX - no erythema or exudates  NASOPHARYNX - limited mirror exam - unable to visualize due to anatomy/gag  LARYNX -  - limited mirror exam - unable to visualize due to anatomy/gag      CHEST AND LUNG   INSPECTION & AUSCULTATION - normal effort, no stridor    CARDIOVASCULAR  AUSCULTATION & PERIPHERAL VASCULAR - regular rate and rhythm.    NEUROLOGIC  MENTAL STATUS - alert, interactive CRANIAL NERVES - normal    LYMPHATIC  HEAD AND NECK - non-palpable; SUPRACLAVICULAR - deferred      Procedure(s):  None          Assessment:      Problem List Items Addressed This Visit    None  Visit Diagnoses       Sialadenitis        RIGHT Submandibular, chronic/recurrent    Sialolithiasis of submandibular gland                     Plan:        Surgery recommended.  Hopefully he can undergo diagnostic and therapeutic sialoendoscopy with Dr. Barahona at [facility].  Alternatively I have offered him a surgery date of 07/18/2024 and memory for transcervical excision of duct and gland on the right side.  Conservative care provided.  Augmentin 875 b.I.d. for 10 days with a refill provided as well as indications.  To start today.    Follow up   As needed          Voice recognition software was used in the creation of this note/communication and any sound-alike errors which may have occurred from its use should be taken in context when interpreting.  If such errors prevent a clear understanding of the note/communication, please contact the office for clarification.

## 2024-06-20 NOTE — PATIENT INSTRUCTIONS
Take the antibiotic (amoxicillin/clavulanic acid) as prescribed completing the entire course starting today.  A refill is available if symptoms improve only to recur.  Intermittent swelling and discomfort with eating are not a reason to take antibiotic but persistent tenderness and pain are.  Foul taste and persistent swelling are another reason to restart antibiotics.    Endoscopic treatment of chronic sialadenitis is a consideration and can only be performed with Dr. Inder Barahona at [facility].  His office will communicate directly to arrange an office visit and discuss options.  Alternatively, we can proceed with excision of the salivary gland duct through the neck as discussed at a mutually convenient date at any number of [facility] locations and surgeons.  Specifically, we could proceed with excision of the gland at the [facility] location in [facility]  07/18/2024 if desired.    Communicate with the office if desiring to proceed with this surgical date otherwise communicate with Dr. Barahona's office.     Conservative care of the salivary gland with heat, massage, hydration and sour candies etc. As discussed and outlined are important.  Antibiotics prescribed.      Voice recognition software was used in the creation of this note/communication and any sound-alike errors which may have occurred from its use should be taken in context when interpreting.  If such errors prevent a clear understanding of the note/communication, please contact the office for clarification.      SALIVARY CARE    Drink plenty of fluids suck on sour candies and massage the affected gland routinely to maintain flow of saliva and prevent buildup and swelling and pain.  Heat to the affected gland may also be beneficial when needed. If swelling and pain persist for several hours to a day or more antibiotics may prove necessary.

## 2024-06-20 NOTE — H&P (VIEW-ONLY)
[facility]    Subjective:      Patient: Faraz Cole Patient PCP: Rigo Staples MD         :  1980     Sex:  male      MRN:  [id number]          Date of Visit: 2024      Chief Complaint: sialolithiasis of submandibular gland, Neck Swelling, and Dysphagia      Patient ID: Faraz Cole is a 43 y.o. male     Patient is a  former smoker with a past medical history of Diabetes with hemoglobin A1c of 6.2%, class 2 obesity, HLD, and HTN referred to me by Dr. Hema Funez in consultation for  sialolithiasis and sialadenitis with symptoms of neck swelling and difficulty swallowing.  Treated with clindamycin.  CT maxillofacial with contrast 2024 and CT soft tissue neck   Without contrast 2024 images reviewed both show inflammatory changes without abscess formation of the right submandibular gland. The contrast-enhanced study does show what appears to be ductal dilation and increased size and enhancement without a distinct mass.  There is a slight area hyperdensity within the gland on the maxillofacial scan which may represent a small stone but there was no clearly identifiable large/obstructive stone on either image series.    Patient reports chronic tenderness of the right submandibular gland and floor of mouth.  Intermittent swelling especially with eating and swallowing.  No fevers or chills.  Occasional foul taste.  No active or known dental infection.  No dental tenderness.  Symptoms ongoing for 6 months but more significant with repeated trips to the ER in the last 2 months.     2024      Labs:  WBC   Date Value Ref Range Status   2024 6.01 3.90 - 12.70 K/uL Final     Hemoglobin   Date Value Ref Range Status   2024 15.0 14.0 - 18.0 g/dL Final     Platelets   Date Value Ref Range Status   2024 193 150 - 450 K/uL Final     Creatinine   Date Value Ref Range Status   2024 1.2 0.5 - 1.4 mg/dL Final     Hemoglobin A1C   Date Value Ref  Range Status   03/16/2021 6.2 (H) 4.0 - 5.6 % Final     Comment:     ADA Screening Guidelines:  5.7-6.4%  Consistent with prediabetes  >or=6.5%  Consistent with diabetes    High levels of fetal hemoglobin interfere with the HbA1C  assay. Heterozygous hemoglobin variants (HbS, HgC, etc)do  not significantly interfere with this assay.   However, presence of multiple variants may affect accuracy.         Past Medical History  He has a past medical history of Diabetes mellitus, Hypertension, Lumbar spondylosis, Morbid obesity, and Type II diabetes mellitus.    Family / Surgical / Social History  His family history includes Diabetes in his father; Hypertension in his father and mother.    Past Surgical History:   Procedure Laterality Date    APPENDECTOMY      CHOLECYSTECTOMY      COLONOSCOPY N/A 12/15/2023    Procedure: COLONOSCOPY;  Surgeon: Kavita Manriquez MD;  Location: [facility];  Service: Gastroenterology;  Laterality: N/A;    COLONOSCOPY W/ POLYPECTOMY  12/15/2023    KNEE ARTHROSCOPY Bilateral     TOTAL KNEE ARTHROPLASTY Right 03/11/2021    Dr. Kennedy Melendez       Social History     Tobacco Use    Smoking status: Former     Types: Cigarettes    Smokeless tobacco: Never   Substance and Sexual Activity    Alcohol use: Not Currently     Comment: socially    Drug use: Never    Sexual activity: Never       Medications  He has a current medication list which includes the following prescription(s): albuterol, aspirin, atorvastatin, cetirizine, clindamycin, clonidine 0.1 mg/24 hr td ptwk, dextroamphetamine-amphetamine, famotidine, fluticasone propionate, furosemide, gabapentin, gabapentin, hydralazine, linaclotide, metformin, ozempic, sildenafil, sumatriptan, valsartan-hydrochlorothiazide, clindamycin, dextroamphetamine-amphetamine, dextromethorphan-guaifenesin  mg/5 ml, victoza 2-anton, naproxen, and tramadol.      Allergies  Review of patient's allergies indicates:   Allergen Reactions    Ace inhibitors Swelling  "    Throat swelling     Ace inhibitors Swelling, Anaphylaxis and Rash     angioedema  Throat swelling   Angioedema  rash    Ace inhibitors Edema     Angioedema    Ace inhibitors Rash     rash       All medications, allergies, and past history have been reviewed.    Objective:      Vitals:      4/16/2024     6:26 AM 6/6/2024     5:02 AM 6/20/2024     2:20 PM   Vitals - 1 value per visit   SYSTOLIC 216  195 185   DIASTOLIC 126  120 114   Pulse 76 84 86   Temp 97.6 °F (36.4 °C) 98 °F (36.7 °C)    Resp 18 20    SPO2 96 % 97 % 98 %   Weight (lb) 300 302 314.05   Weight (kg) 136.079 136.986 142.45   Height 6' 4" (1.93 m)  6' 4" (1.93 m)   BMI (Calculated) 36.5  38.2   Pain Score   Zero       Significant value       Body surface area is 2.76 meters squared.    Physical Exam:    GENERAL  APPEARANCE -  alert, appears stated age, cooperative, and moderately obese  BARRIER(S) TO COMMUNICATION -  none VOICE - appropriate for age and gender    INTEGUMENTARY  no suspicious head and neck lesions    HEENT  HEAD: Normocephalic, without obvious abnormality, atraumatic  FACE: INSPECTION - Symmetric, no signs of trauma, no suspicious lesion(s)      STRENGTH - facial symmetry intact     PALPATION -  No masses     SALIVARY GLANDS -   Right submandibular gland tenderness.  Floor of mouth soft.  No drainage from Reynolds's papilla.  No palpable stone transorally or transcervically.  No palpable mass or gross asymmetry.   NECK/THYROID: normal atraumatic, no neck masses, normal thyroid, no jvd    EYES  Normal occular alignment and mobility with no visible nystagmus at rest    EARS/NOSE/MOUTH/THROAT  EARS  PINNAE AND EXTERNAL EARS - no suspicious lesion OTOSCOPIC EXAM (surgical microscopy was not used for visualization/instrumentation): EAR EXAM - Normal ear canals, tympanic membranes and mobility, and middle ear spaces bilaterally.  HEARING - grossly intact to voice/finger rub    NOSE AND SINUSES  EXTERNAL NOSE - Grossly normal for age/sex  " SEPTUM - normal/no obstruction on anterior exam without decongestion TURBINATES - within normal limits MUCOSA - within normal limits     MOUTH AND THROAT   ORAL CAVITY, LIPS, TEETH, GUMS & TONGUE - moist, no suspicious lesions  OROPHARYNX /TONSILS/PHARYNGEAL WALLS/HYPOPHARYNX - no erythema or exudates  NASOPHARYNX - limited mirror exam - unable to visualize due to anatomy/gag  LARYNX -  - limited mirror exam - unable to visualize due to anatomy/gag      CHEST AND LUNG   INSPECTION & AUSCULTATION - normal effort, no stridor    CARDIOVASCULAR  AUSCULTATION & PERIPHERAL VASCULAR - regular rate and rhythm.    NEUROLOGIC  MENTAL STATUS - alert, interactive CRANIAL NERVES - normal    LYMPHATIC  HEAD AND NECK - non-palpable; SUPRACLAVICULAR - deferred      Procedure(s):  None          Assessment:      Problem List Items Addressed This Visit    None  Visit Diagnoses       Sialadenitis        RIGHT Submandibular, chronic/recurrent    Sialolithiasis of submandibular gland                     Plan:        Surgery recommended.  Hopefully he can undergo diagnostic and therapeutic sialoendoscopy with Dr. Barahona at [facility].  Alternatively I have offered him a surgery date of 07/18/2024 and memory for transcervical excision of duct and gland on the right side.  Conservative care provided.  Augmentin 875 b.I.d. for 10 days with a refill provided as well as indications.  To start today.    Follow up   As needed          Voice recognition software was used in the creation of this note/communication and any sound-alike errors which may have occurred from its use should be taken in context when interpreting.  If such errors prevent a clear understanding of the note/communication, please contact the office for clarification.

## 2024-07-01 ENCOUNTER — TELEPHONE (OUTPATIENT)
Dept: OTOLARYNGOLOGY | Facility: CLINIC | Age: 44
End: 2024-07-01
Payer: MEDICAID

## 2024-07-01 NOTE — TELEPHONE ENCOUNTER
Appt scheduled for 7/15 with Dr. Barahona.  Appt details emailed to pt per his request.    ----- Message from Karey Marrero RN sent at 7/1/2024  4:08 PM CDT -----  Can you offer him an appt for Thurs 7/11 with [place]? I can drop it in if it works for him.  ----- Message -----  From: Inna Baez  Sent: 7/1/2024   4:00 PM CDT  To: [facility] Staff    Consult/Advisory    Name Of Caller:Faraz       Contact Preference:[phone]    Nature of call: Ptn called regarding a referral he has in the sytem I tried to set a appt no appts are showing avail

## 2024-07-15 ENCOUNTER — TELEPHONE (OUTPATIENT)
Dept: OTOLARYNGOLOGY | Facility: CLINIC | Age: 44
End: 2024-07-15

## 2024-07-15 ENCOUNTER — OFFICE VISIT (OUTPATIENT)
Dept: OTOLARYNGOLOGY | Facility: CLINIC | Age: 44
End: 2024-07-15
Payer: MEDICAID

## 2024-07-15 VITALS
WEIGHT: 315 LBS | SYSTOLIC BLOOD PRESSURE: 193 MMHG | HEART RATE: 84 BPM | DIASTOLIC BLOOD PRESSURE: 139 MMHG | BODY MASS INDEX: 38.64 KG/M2

## 2024-07-15 DIAGNOSIS — K11.20 SIALADENITIS: ICD-10-CM

## 2024-07-15 PROCEDURE — 1160F RVW MEDS BY RX/DR IN RCRD: CPT | Mod: CPTII,,, | Performed by: OTOLARYNGOLOGY

## 2024-07-15 PROCEDURE — 99999 PR PBB SHADOW E&M-EST. PATIENT-LVL V: CPT | Mod: PBBFAC,,, | Performed by: OTOLARYNGOLOGY

## 2024-07-15 PROCEDURE — 3077F SYST BP >= 140 MM HG: CPT | Mod: CPTII,,, | Performed by: OTOLARYNGOLOGY

## 2024-07-15 PROCEDURE — 99215 OFFICE O/P EST HI 40 MIN: CPT | Mod: PBBFAC | Performed by: OTOLARYNGOLOGY

## 2024-07-15 PROCEDURE — 99214 OFFICE O/P EST MOD 30 MIN: CPT | Mod: S$PBB,,, | Performed by: OTOLARYNGOLOGY

## 2024-07-15 PROCEDURE — 1159F MED LIST DOCD IN RCRD: CPT | Mod: CPTII,,, | Performed by: OTOLARYNGOLOGY

## 2024-07-15 PROCEDURE — 3080F DIAST BP >= 90 MM HG: CPT | Mod: CPTII,,, | Performed by: OTOLARYNGOLOGY

## 2024-07-15 PROCEDURE — 3008F BODY MASS INDEX DOCD: CPT | Mod: CPTII,,, | Performed by: OTOLARYNGOLOGY

## 2024-07-15 RX ORDER — SODIUM CHLORIDE 0.9 % (FLUSH) 0.9 %
10 SYRINGE (ML) INJECTION
Status: CANCELLED | OUTPATIENT
Start: 2024-07-15

## 2024-07-15 RX ORDER — LIDOCAINE HYDROCHLORIDE 10 MG/ML
1 INJECTION, SOLUTION EPIDURAL; INFILTRATION; INTRACAUDAL; PERINEURAL ONCE
Status: CANCELLED | OUTPATIENT
Start: 2024-07-15 | End: 2024-07-15

## 2024-07-15 NOTE — TELEPHONE ENCOUNTER
----- Message from John Fu sent at 7/15/2024  2:20 PM CDT -----  Regarding: Schedule sooner surgery  Contact: [phone]  Patient is calling to confirm if Dr. Howe can do the removal of his glands sooner. Please contact patient to further discuss as this is the lonely info that was provided to me thank you.

## 2024-07-15 NOTE — ASSESSMENT & PLAN NOTE
Recurrent right submandibular sialoadenitis.  I offered him the options of salivary endoscopy versus submandibular gland resection.  He reports he would like to pursue definitive solution to his problem so I recommended that we proceed with submandibular gland resection.  Surgery is scheduled on August 7, 2024.

## 2024-07-16 ENCOUNTER — TELEPHONE (OUTPATIENT)
Dept: OTOLARYNGOLOGY | Facility: CLINIC | Age: 44
End: 2024-07-16
Payer: MEDICAID

## 2024-07-16 NOTE — TELEPHONE ENCOUNTER
----- Message from Meagan Lynch sent at 7/16/2024 11:52 AM CDT -----  Regarding: Reschedule Surgery  Contact: [phone]  Calling to speak with nurse to reschedule surgery/procedure as soon as possible to [facility] where Dr Howe can do surgery and patient will be closer to home. Please call to schedule as soon as possible with patient. Thanks!  [phone]

## 2024-07-17 ENCOUNTER — TELEPHONE (OUTPATIENT)
Dept: OTOLARYNGOLOGY | Facility: CLINIC | Age: 44
End: 2024-07-17
Payer: MEDICAID

## 2024-07-17 ENCOUNTER — ANESTHESIA EVENT (OUTPATIENT)
Dept: SURGERY | Facility: HOSPITAL | Age: 44
End: 2024-07-17
Payer: MEDICAID

## 2024-07-17 ENCOUNTER — PRE-OP/PRE-PROCEDURE ORDERS (OUTPATIENT)
Dept: OTOLARYNGOLOGY | Facility: CLINIC | Age: 44
End: 2024-07-17
Payer: MEDICAID

## 2024-07-17 DIAGNOSIS — K11.20 SIALADENITIS: Primary | ICD-10-CM

## 2024-07-17 DIAGNOSIS — D37.030 NEOPLASM OF UNCERTAIN BEHAVIOR OF PAROTID SALIVARY GLAND: ICD-10-CM

## 2024-07-17 DIAGNOSIS — K11.5 SIALOLITHIASIS OF SUBMANDIBULAR GLAND: ICD-10-CM

## 2024-07-17 RX ORDER — DEXAMETHASONE SODIUM PHOSPHATE 4 MG/ML
10 INJECTION, SOLUTION INTRA-ARTICULAR; INTRALESIONAL; INTRAMUSCULAR; INTRAVENOUS; SOFT TISSUE
Status: CANCELLED | OUTPATIENT
Start: 2024-07-17

## 2024-07-17 NOTE — PRE-PROCEDURE INSTRUCTIONS
Patient reviewed-spoke with Anesthesiologist on call, Dr. Wise who ok's this patient to proceed at [facility].

## 2024-07-17 NOTE — TELEPHONE ENCOUNTER
Pt called back. He states that his wife is able to drive him tomorrow to and from surgery. Advised that I would have Dr. Howe post the case request now. Advised he should get a call from the [facility] Pre-[facility] nurse before end of today with his arrival time for the surgery tomorrow. Tentatively scheduled PO drain removal for Monday (if drain is used) and scheduled 3 week PO for Friday 8/9/24 at [facility]. Will also notify Dr. Barahona's staff that pt is going to have Dr. Howe do the surgery. Thanks, Rylie

## 2024-07-17 NOTE — TELEPHONE ENCOUNTER
Pt called back. States that he spoke with the pre-op nurse, but she spoke very quickly and he missed some of the conversation. I re-read the pre-op nurses notes to pt for tomorrow's procedure. Thanks, Rylie

## 2024-07-17 NOTE — TELEPHONE ENCOUNTER
Called pt back. Advised that if he would like Dr. Howe to do the surgery instead of Dr. Barahona, Dr. Howe has availability tomorrow at our [facility] location. Pt will speak to his wife and will call right back. Thanks, Rylie

## 2024-07-17 NOTE — TELEPHONE ENCOUNTER
Good morning. Patient has decided he would like Dr. Howe to do the removal of his submandibular gland this week instead of waiting until August as scheduled with Dr. Barahona. Dr. Howe will put in a new case request. Thanks, Rylie

## 2024-07-17 NOTE — PRE-PROCEDURE INSTRUCTIONS
Patient was informed of pre-procedure instructions and arrival time. Pt verbalized understanding and is to be accompanied by wife.    Dear Faraz ,     You are scheduled for a procedure with Dr. Howe on 7/18/2024. Your scheduled arrival time is 10:30 am.  This arrival time is roughly 2 hours before your anticipated procedure time to allow sufficient time for pre-op.  Please wear comfortable clothing  This procedure will take place at the [facility] at the corner of [facility].  [facility] to [facility]. The address is:     [address] [place] [address].  [place] [id number]     After entering the building, proceed to the second floor and check in with registration.      Your fasting instructions are as follows:     Nothing to eat after midnight the evening before your surgery.   You may drink clear liquids up until 2 hours prior to your arrival time.      You MUST have a responsible adult to bring you home.     The morning of your procedure, please hold the following medications:  The evening before and morning of your procedure, please hold the following medications:  -Aspirin and Aspirin-containing products (Goody's powder, Excedrin)  -NSAIDs (Advil, Ibuprofen, Aleve, Diclofenac)  -Vitamins/Supplements  -Herbal remedies/Teas  -Stimulants (Adderall, Vyvanse, Adipex)  -Diabetic medication (Please bring with you day of procedure) Metormin  -Prescription creams/gels/lotions  -Lasix  -Diovan        *May take Tylenol if needed         The evening before and morning of your procedure, take a shower using antibacterial soap (ex: Hibiclens or Dial antibacterial soap). DO NOT apply deodorant, lotion, cologne, or anything else to the skin. Do not wear jewelry or bring any valuables with you.  .     Please do not wear contact lenses the day of your procedure.   Bring any inhalers that you may need.     If you have any procedure-specific questions, please  call your surgeon's office. Any other questions, don't hesitate to call at [phone]     Thanks,  Pre-Admit Testing  Anesthesia Dept OC

## 2024-07-18 ENCOUNTER — HOSPITAL ENCOUNTER (OUTPATIENT)
Facility: HOSPITAL | Age: 44
Discharge: HOME | End: 2024-07-18
Attending: OTOLARYNGOLOGY | Admitting: OTOLARYNGOLOGY
Payer: MEDICAID

## 2024-07-18 ENCOUNTER — ANESTHESIA (OUTPATIENT)
Dept: SURGERY | Facility: HOSPITAL | Age: 44
End: 2024-07-18
Payer: MEDICAID

## 2024-07-18 VITALS
HEART RATE: 79 BPM | TEMPERATURE: 99 F | OXYGEN SATURATION: 96 % | DIASTOLIC BLOOD PRESSURE: 76 MMHG | SYSTOLIC BLOOD PRESSURE: 178 MMHG | RESPIRATION RATE: 20 BRPM

## 2024-07-18 DIAGNOSIS — D37.030 NEOPLASM OF UNCERTAIN BEHAVIOR OF PAROTID SALIVARY GLAND: ICD-10-CM

## 2024-07-18 DIAGNOSIS — K11.5 SIALOLITHIASIS OF SUBMANDIBULAR GLAND: ICD-10-CM

## 2024-07-18 DIAGNOSIS — K11.20 SIALADENITIS: ICD-10-CM

## 2024-07-18 LAB
POCT GLUCOSE: 105 MG/DL (ref 70–110)
POCT GLUCOSE: 123 MG/DL (ref 70–110)

## 2024-07-18 PROCEDURE — 25000003 PHARM REV CODE 250: Performed by: NURSE ANESTHETIST, CERTIFIED REGISTERED

## 2024-07-18 PROCEDURE — 99900035 HC TECH TIME PER 15 MIN (STAT)

## 2024-07-18 PROCEDURE — 71000033 HC RECOVERY, INTIAL HOUR: Performed by: OTOLARYNGOLOGY

## 2024-07-18 PROCEDURE — 71000016 HC POSTOP RECOV ADDL HR: Performed by: OTOLARYNGOLOGY

## 2024-07-18 PROCEDURE — 94761 N-INVAS EAR/PLS OXIMETRY MLT: CPT

## 2024-07-18 PROCEDURE — 25000003 PHARM REV CODE 250: Performed by: OTOLARYNGOLOGY

## 2024-07-18 PROCEDURE — 37000008 HC ANESTHESIA 1ST 15 MINUTES: Performed by: OTOLARYNGOLOGY

## 2024-07-18 PROCEDURE — 25000003 PHARM REV CODE 250: Performed by: STUDENT IN AN ORGANIZED HEALTH CARE EDUCATION/TRAINING PROGRAM

## 2024-07-18 PROCEDURE — 71000015 HC POSTOP RECOV 1ST HR: Performed by: OTOLARYNGOLOGY

## 2024-07-18 PROCEDURE — 37000009 HC ANESTHESIA EA ADD 15 MINS: Performed by: OTOLARYNGOLOGY

## 2024-07-18 PROCEDURE — 82962 GLUCOSE BLOOD TEST: CPT | Performed by: OTOLARYNGOLOGY

## 2024-07-18 PROCEDURE — 42440 EXCISE SUBMAXILLARY GLAND: CPT | Mod: RT,,, | Performed by: OTOLARYNGOLOGY

## 2024-07-18 PROCEDURE — 36000707: Performed by: OTOLARYNGOLOGY

## 2024-07-18 PROCEDURE — 88305 TISSUE EXAM BY PATHOLOGIST: CPT | Mod: 26,,, | Performed by: STUDENT IN AN ORGANIZED HEALTH CARE EDUCATION/TRAINING PROGRAM

## 2024-07-18 PROCEDURE — 63600175 PHARM REV CODE 636 W HCPCS: Performed by: STUDENT IN AN ORGANIZED HEALTH CARE EDUCATION/TRAINING PROGRAM

## 2024-07-18 PROCEDURE — 63600175 PHARM REV CODE 636 W HCPCS: Performed by: NURSE ANESTHETIST, CERTIFIED REGISTERED

## 2024-07-18 PROCEDURE — 27201423 OPTIME MED/SURG SUP & DEVICES STERILE SUPPLY: Performed by: OTOLARYNGOLOGY

## 2024-07-18 PROCEDURE — C1729 CATH, DRAINAGE: HCPCS | Performed by: OTOLARYNGOLOGY

## 2024-07-18 PROCEDURE — 63600175 PHARM REV CODE 636 W HCPCS: Performed by: OTOLARYNGOLOGY

## 2024-07-18 PROCEDURE — 36000706: Performed by: OTOLARYNGOLOGY

## 2024-07-18 PROCEDURE — 88305 TISSUE EXAM BY PATHOLOGIST: CPT | Performed by: STUDENT IN AN ORGANIZED HEALTH CARE EDUCATION/TRAINING PROGRAM

## 2024-07-18 RX ORDER — OXYCODONE HYDROCHLORIDE 5 MG/1
10 TABLET ORAL EVERY 4 HOURS PRN
Status: DISCONTINUED | OUTPATIENT
Start: 2024-07-18 | End: 2024-07-18 | Stop reason: HOSPADM

## 2024-07-18 RX ORDER — FENTANYL CITRATE 50 UG/ML
INJECTION, SOLUTION INTRAMUSCULAR; INTRAVENOUS
Status: DISCONTINUED | OUTPATIENT
Start: 2024-07-18 | End: 2024-07-18

## 2024-07-18 RX ORDER — SUCCINYLCHOLINE CHLORIDE 20 MG/ML
INJECTION INTRAMUSCULAR; INTRAVENOUS
Status: DISCONTINUED | OUTPATIENT
Start: 2024-07-18 | End: 2024-07-18

## 2024-07-18 RX ORDER — OXYCODONE HYDROCHLORIDE 5 MG/1
5 TABLET ORAL
Status: DISCONTINUED | OUTPATIENT
Start: 2024-07-18 | End: 2024-07-18 | Stop reason: HOSPADM

## 2024-07-18 RX ORDER — ACETAMINOPHEN 500 MG
1000 TABLET ORAL ONCE
Status: COMPLETED | OUTPATIENT
Start: 2024-07-18 | End: 2024-07-18

## 2024-07-18 RX ORDER — PHENYLEPHRINE HYDROCHLORIDE 10 MG/ML
INJECTION INTRAVENOUS CONTINUOUS PRN
Status: DISCONTINUED | OUTPATIENT
Start: 2024-07-18 | End: 2024-07-18

## 2024-07-18 RX ORDER — OXYCODONE HYDROCHLORIDE 5 MG/1
5 TABLET ORAL EVERY 4 HOURS PRN
Qty: 10 TABLET | Refills: 0 | Status: SHIPPED | OUTPATIENT
Start: 2024-07-18

## 2024-07-18 RX ORDER — HYDROMORPHONE HYDROCHLORIDE 1 MG/ML
0.2 INJECTION, SOLUTION INTRAMUSCULAR; INTRAVENOUS; SUBCUTANEOUS EVERY 5 MIN PRN
Status: DISCONTINUED | OUTPATIENT
Start: 2024-07-18 | End: 2024-07-18 | Stop reason: HOSPADM

## 2024-07-18 RX ORDER — IBUPROFEN 800 MG/1
800 TABLET ORAL
Qty: 30 TABLET | Refills: 0 | Status: SHIPPED | OUTPATIENT
Start: 2024-07-18

## 2024-07-18 RX ORDER — DEXAMETHASONE SODIUM PHOSPHATE 4 MG/ML
INJECTION, SOLUTION INTRA-ARTICULAR; INTRALESIONAL; INTRAMUSCULAR; INTRAVENOUS; SOFT TISSUE
Status: DISCONTINUED | OUTPATIENT
Start: 2024-07-18 | End: 2024-07-18

## 2024-07-18 RX ORDER — IPRATROPIUM BROMIDE AND ALBUTEROL SULFATE 2.5; .5 MG/3ML; MG/3ML
3 SOLUTION RESPIRATORY (INHALATION) EVERY 4 HOURS PRN
Status: DISCONTINUED | OUTPATIENT
Start: 2024-07-18 | End: 2024-07-18 | Stop reason: HOSPADM

## 2024-07-18 RX ORDER — PROCHLORPERAZINE EDISYLATE 5 MG/ML
5 INJECTION INTRAMUSCULAR; INTRAVENOUS EVERY 30 MIN PRN
Status: DISCONTINUED | OUTPATIENT
Start: 2024-07-18 | End: 2024-07-18 | Stop reason: HOSPADM

## 2024-07-18 RX ORDER — ONDANSETRON HYDROCHLORIDE 2 MG/ML
INJECTION, SOLUTION INTRAVENOUS
Status: DISCONTINUED | OUTPATIENT
Start: 2024-07-18 | End: 2024-07-18

## 2024-07-18 RX ORDER — KETAMINE HYDROCHLORIDE 100 MG/ML
INJECTION, SOLUTION INTRAMUSCULAR; INTRAVENOUS
Status: DISCONTINUED | OUTPATIENT
Start: 2024-07-18 | End: 2024-07-18

## 2024-07-18 RX ORDER — PROPOFOL 10 MG/ML
VIAL (ML) INTRAVENOUS
Status: DISCONTINUED | OUTPATIENT
Start: 2024-07-18 | End: 2024-07-18

## 2024-07-18 RX ORDER — DEXAMETHASONE SODIUM PHOSPHATE 4 MG/ML
10 INJECTION, SOLUTION INTRA-ARTICULAR; INTRALESIONAL; INTRAMUSCULAR; INTRAVENOUS; SOFT TISSUE
Status: DISCONTINUED | OUTPATIENT
Start: 2024-07-18 | End: 2024-07-18 | Stop reason: HOSPADM

## 2024-07-18 RX ORDER — ROCURONIUM BROMIDE 10 MG/ML
INJECTION, SOLUTION INTRAVENOUS
Status: DISCONTINUED | OUTPATIENT
Start: 2024-07-18 | End: 2024-07-18

## 2024-07-18 RX ORDER — HYDRALAZINE HYDROCHLORIDE 20 MG/ML
10 INJECTION INTRAMUSCULAR; INTRAVENOUS EVERY 10 MIN PRN
Status: DISCONTINUED | OUTPATIENT
Start: 2024-07-18 | End: 2024-07-18 | Stop reason: HOSPADM

## 2024-07-18 RX ORDER — LIDOCAINE HYDROCHLORIDE AND EPINEPHRINE 10; 10 MG/ML; UG/ML
INJECTION, SOLUTION INFILTRATION; PERINEURAL
Status: DISCONTINUED | OUTPATIENT
Start: 2024-07-18 | End: 2024-07-18 | Stop reason: HOSPADM

## 2024-07-18 RX ORDER — MIDAZOLAM HYDROCHLORIDE 1 MG/ML
INJECTION INTRAMUSCULAR; INTRAVENOUS
Status: DISCONTINUED | OUTPATIENT
Start: 2024-07-18 | End: 2024-07-18

## 2024-07-18 RX ORDER — LIDOCAINE HYDROCHLORIDE 20 MG/ML
INJECTION INTRAVENOUS
Status: DISCONTINUED | OUTPATIENT
Start: 2024-07-18 | End: 2024-07-18

## 2024-07-18 RX ORDER — CEFAZOLIN SODIUM 1 G/3ML
INJECTION, POWDER, FOR SOLUTION INTRAMUSCULAR; INTRAVENOUS
Status: DISCONTINUED | OUTPATIENT
Start: 2024-07-18 | End: 2024-07-18

## 2024-07-18 RX ORDER — AMOXICILLIN 500 MG/1
500 CAPSULE ORAL 3 TIMES DAILY
Qty: 15 CAPSULE | Refills: 0 | Status: SHIPPED | OUTPATIENT
Start: 2024-07-18 | End: 2024-07-23

## 2024-07-18 RX ADMIN — HYDROMORPHONE HYDROCHLORIDE 0.2 MG: 1 INJECTION, SOLUTION INTRAMUSCULAR; INTRAVENOUS; SUBCUTANEOUS at 03:07

## 2024-07-18 RX ADMIN — SODIUM CHLORIDE: 0.9 INJECTION, SOLUTION INTRAVENOUS at 12:07

## 2024-07-18 RX ADMIN — PROPOFOL 50 MCG/KG/MIN: 10 INJECTION, EMULSION INTRAVENOUS at 01:07

## 2024-07-18 RX ADMIN — ONDANSETRON 4 MG: 2 INJECTION INTRAMUSCULAR; INTRAVENOUS at 01:07

## 2024-07-18 RX ADMIN — PROPOFOL 35 MG: 10 INJECTION, EMULSION INTRAVENOUS at 01:07

## 2024-07-18 RX ADMIN — FENTANYL CITRATE 100 MCG: 50 INJECTION, SOLUTION INTRAMUSCULAR; INTRAVENOUS at 12:07

## 2024-07-18 RX ADMIN — PROPOFOL 90 MG: 10 INJECTION, EMULSION INTRAVENOUS at 12:07

## 2024-07-18 RX ADMIN — ACETAMINOPHEN 1000 MG: 500 TABLET ORAL at 10:07

## 2024-07-18 RX ADMIN — OXYCODONE 10 MG: 5 TABLET ORAL at 03:07

## 2024-07-18 RX ADMIN — PHENYLEPHRINE HYDROCHLORIDE 0.25 MCG/KG/MIN: 10 INJECTION INTRAVENOUS at 01:07

## 2024-07-18 RX ADMIN — DEXAMETHASONE SODIUM PHOSPHATE 10 MG: 4 INJECTION INTRA-ARTICULAR; INTRALESIONAL; INTRAMUSCULAR; INTRAVENOUS; SOFT TISSUE at 01:07

## 2024-07-18 RX ADMIN — PROPOFOL 200 MG: 10 INJECTION, EMULSION INTRAVENOUS at 12:07

## 2024-07-18 RX ADMIN — KETAMINE HYDROCHLORIDE 30 MG: 100 INJECTION INTRAMUSCULAR; INTRAVENOUS at 01:07

## 2024-07-18 RX ADMIN — MIDAZOLAM HYDROCHLORIDE 2 MG: 1 INJECTION, SOLUTION INTRAMUSCULAR; INTRAVENOUS at 12:07

## 2024-07-18 RX ADMIN — FENTANYL CITRATE 50 MCG: 50 INJECTION, SOLUTION INTRAMUSCULAR; INTRAVENOUS at 01:07

## 2024-07-18 RX ADMIN — SUCCINYLCHOLINE CHLORIDE 160 MG: 20 INJECTION, SOLUTION INTRAMUSCULAR; INTRAVENOUS at 12:07

## 2024-07-18 RX ADMIN — ROCURONIUM BROMIDE 5 MG: 10 INJECTION INTRAVENOUS at 12:07

## 2024-07-18 RX ADMIN — CEFAZOLIN 3 G: 330 INJECTION, POWDER, FOR SOLUTION INTRAMUSCULAR; INTRAVENOUS at 01:07

## 2024-07-18 RX ADMIN — LIDOCAINE HYDROCHLORIDE 100 MG: 20 INJECTION INTRAVENOUS at 12:07

## 2024-07-18 RX ADMIN — GLYCOPYRROLATE 0.2 MG: 0.2 INJECTION, SOLUTION INTRAMUSCULAR; INTRAVENOUS at 01:07

## 2024-07-18 NOTE — PLAN OF CARE
AZALIA drain education and return demonstration provided to the patient and his wife Chasity.  No concerns addressed regarding the eduction of the AZALIA drain.

## 2024-07-18 NOTE — TRANSFER OF CARE
Anesthesia Transfer of Care Note    Patient: Faraz Cole    Procedure(s) Performed: Procedure(s) (LRB):  EXCISION, SUBMANDIBULAR GLAND (Right)    Patient location: PACU    Anesthesia Type: general    Transport from OR: Transported from OR on 100% O2 by closed face mask with adequate spontaneous ventilation    Post pain: adequate analgesia    Post assessment: no apparent anesthetic complications    Post vital signs: stable    Level of consciousness: sedated    Nausea/Vomiting: no nausea/vomiting    Complications: none    Transfer of care protocol was followed    Last vitals: Visit Vitals  BP (!) 188/113 (Patient Position: Sitting)   Pulse 76   Temp 36.6 °C (97.9 °F) (Temporal)   Resp 16   SpO2 100%

## 2024-07-18 NOTE — ANESTHESIA POSTPROCEDURE EVALUATION
Anesthesia Post Evaluation    Patient: Faraz Cole    Procedure(s) Performed: Procedure(s) (LRB):  EXCISION, SUBMANDIBULAR GLAND (Right)    Final Anesthesia Type: general      Patient location during evaluation: PACU  Patient participation: Yes- Able to Participate  Level of consciousness: awake  Post-procedure vital signs: reviewed and stable  Pain management: adequate  Airway patency: patent    PONV status at discharge: No PONV  Anesthetic complications: no      Cardiovascular status: blood pressure returned to baseline  Respiratory status: unassisted  Hydration status: euvolemic  Follow-up not needed.              Vitals Value Taken Time   /83 07/18/24 1532   Temp 37 °C (98.6 °F) 07/18/24 1435   Pulse 73 07/18/24 1550   Resp 21 07/18/24 1550   SpO2 96 % 07/18/24 1550   Vitals shown include unfiled device data.      Event Time   Out of Recovery 15:00:00         Pain/Julia Score: Pain Rating Prior to Med Admin: 8 (7/18/2024  3:23 PM)  Julia Score: 9 (7/18/2024  3:30 PM)

## 2024-07-18 NOTE — OP NOTE
[facility]  Otolaryngology - Head/Neck Surgery Department  Operative Note       Date of Procedure: 7/18/2024     Pre-Operative Diagnosis:   Sialadenitis [K11.20]  Sialolithiasis of submandibular gland [K11.5]    Post-Operative Diagnosis:  Sialadenitis [K11.20]  Sialolithiasis of submandibular gland [K11.5]    Procedure:   EXCISION, SUBMANDIBULAR GLAND (Right)    Surgeon(s):  Resident Surgeon:  Zac Wild MD  Attending Surgeon:  Zacarias Howe MD    Anesthesia: General  Anesthesiologist: Sebastian Cruz MD  CRNA: Mariana Harrison CRNA; Eleni Aguilera CRNA    Estimated Blood Loss (EBL): Scant    Implants: * No implants in log *    Specimens:  Right Submandibular Gland    Complications: None    Indication:   44 y.o. male with recurrent pain and swelling for the right submandibular gland.  No stone disease seen on CT.     Consent was signed by patient (guardian) after all risks, benefits, limitations and alternatives to surgery were discussed in detail and legal consent executed and read at time out in the room.    Findings:     Normal appearing gland and duct.  Lingual and Hypoglossal nerves identified and intact.    Procedure in Detail:     After informed consent was obtained in which all risks, benefits, limitations and alternatives were discussed patient was brought to the operating room and placed supine on the operating table.      General anesthesia was initiated and maintained by Anesthesia by GETA with 7.5 ETT.  No long acting paralytics were used.    Patient monitoring was applied to the right lower lip orbicularis auris and the submental/mentalis in accordance with 's recommendations with good resistances and the monitor was monitored throughout the case with minimal stimulation associated with retraction.    The patient's face was marked in the hold area and remarked in the operating room and cleansed with alcohol and infiltrated above and below with 1% lidocaine with  epinephrine approximately 5 mL was used.  ChloraPrep was then performed allowed to dry for over 3 minutes at which point bio occlusive drapes were used to be visualized facial movement while performing the surgery as well as towels and standard disposable drapes.  Time-out was performed.      Incision was made in the iris at least 2 fingerbreadths below the mandible following resting skin tension lines and carried down sharply through the platysma with point bleeders and small blood vessels addressed with the Bovie electrocautery.  Subplatysmal flap was then elevated superiorly and the fascia incised below the level of the submandibular gland which was then reflected superiorly.  The facial vessels were identified laterally and dissected off of the gland dividing and tying branches to the gland.  The gland was reflected anteriorly and medially dissecting it to the mylohyoid muscle which was retracted anteriorly identifying the lingual nerve and inferiorly and deep the hypoglossal nerve.  The ganglion was divided off of the lingual nerve and the duct divided and tied with 3-0 Vicryl.  The wound was copiously clean with saline any bleeders were addressed with bipolar electrocautery.  The primary hemostasis was obtained using the LigaSure radiofrequency device as well as some 3-0 ties.      Once irrigated a round drain was exited with the trocar posterior lateral to the apex of the incision and sutured to the skin with 3-0 nylon.      Interrupted buried platysmal and deep dermal sutures were used of 3-0 Vicryl and the skin was closed with running subcuticular 5 0 Monocryl and skin glue for a 3 layered complex closure of a proximally 8 cm.      Patient was returned to the care of anesthesia and pending transfer to the recovery room at the time of this note.    All sponge count needle, and instrument counts were correct at the end of the procedure.    There were no known complications of surgery at the time of this  operative note's creation.    I was present and participated in the case in its entirety.     Voice recognition software was used in the creation of this operative note, any sound alike areas which may have occurred should be taken in context when interpreting.       Condition: Good  Disposition: PACU - hemodynamically stable.  Attestation: I was present and scrubbed for the entire procedure.    Zacarias Howe MD, FACS  [facility] of Otol[facility]ology - Head and Neck Surgery

## 2024-07-18 NOTE — PLAN OF CARE
Pt blood pressure 188/113. Pt asystematic. Anesthesia made aware and ok to proceed with procedure.

## 2024-07-18 NOTE — ANESTHESIA PREPROCEDURE EVALUATION
[facility]  Anesthesia Pre-Operative Evaluation         Patient Name: Faraz Cole  YOB: 1980  MRN: [id number]    SUBJECTIVE:     07/18/2024    Procedure(s) (LRB):  EXCISION, SUBMANDIBULAR GLAND (Right)    Faraz Cole is a 44 y.o. male here for Procedure(s) (LRB):  EXCISION, SUBMANDIBULAR GLAND (Right)    Drips:     Patient Active Problem List   Diagnosis    Right knee pain    Finger dislocation    Finger swelling    Finger pain, right    Cellulitis of right lower extremity    Status post total right knee arthroplasty on 3/11/21    Type II diabetes mellitus    Postoperative spinal headache    Lumbar radiculopathy    Lumbar spondylosis    DDD (degenerative disc disease), lumbar    Neck pain    Myalgia    Hypertension    Sialadenitis       Review of patient's allergies indicates:   Allergen Reactions    Ace inhibitors Swelling     Throat swelling     Ace inhibitors Swelling, Anaphylaxis and Rash     angioedema  Throat swelling   Angioedema  rash    Ace inhibitors Edema     Angioedema    Ace inhibitors Rash     rash       No current facility-administered medications on file prior to encounter.     Current Outpatient Medications on File Prior to Encounter   Medication Sig Dispense Refill    aspirin (ECOTRIN) 81 MG EC tablet 81 mg by oral route.      atorvastatin (LIPITOR) 20 MG tablet Take 20 mg by mouth every evening.      dextroamphetamine-amphetamine (ADDERALL XR) 20 MG 24 hr capsule Take 20 mg by mouth every morning.      famotidine (PEPCID) 20 MG tablet Take 1 tablet (20 mg total) by mouth 2 (two) times daily as needed for Heartburn. 60 tablet 0    furosemide (LASIX) 20 MG tablet Take 20 mg by mouth daily as needed.      gabapentin (NEURONTIN) 100 MG capsule Take 100 mg by mouth every 12 (twelve) hours.      hydrALAZINE (APRESOLINE) 25 MG tablet Take 25 mg by mouth 3 (three) times daily.      metFORMIN (GLUCOPHAGE) 500 MG tablet Take 500 mg by mouth daily with breakfast.       valsartan-hydrochlorothiazide (DIOVAN-HCT) 320-25 mg per tablet Take 1 tablet by mouth once daily.      albuterol (PROVENTIL/VENTOLIN HFA) 90 mcg/actuation inhaler 1 puff every 4 (four) hours as needed.      amoxicillin-clavulanate 875-125mg (AUGMENTIN) 875-125 mg per tablet Take 1 tablet by mouth 2 (two) times daily. 20 tablet 1    cetirizine (ZYRTEC) 10 MG tablet Take 10 mg by mouth as needed.      clindamycin (CLEOCIN T) 1 % external solution APPLY SOLUTION TOPICALLY TO AFFECTED AREA ONCE DAILY IN THE MORNING      cloNIDine 0.1 mg/24 hr td ptwk (CATAPRES) 0.1 mg/24 hr 1 patch every 7 days.      fluticasone propionate (FLONASE) 50 mcg/actuation nasal spray 1 spray by Each Nostril route 2 (two) times daily.      linaCLOtide (LINZESS) 72 mcg Cap capsule TAKE 1 CAPSULE BY MOUTH ONCE DAILY ON AN EMPTY STOMACH AT LEAST 30 MINUTES BEFORE THE FIRST MEAL OF THE DAY      liraglutide 0.6 mg/0.1 mL, 18 mg/3 mL, subq PNIJ (VICTOZA 2-MATT) 0.6 mg/0.1 mL (18 mg/3 mL) PnIj pen 1.8 mg. (Patient not taking: Reported on 6/20/2024)      OZEMPIC 2 mg/dose (8 mg/3 mL) PnIj Inject 2 mg into the skin every 7 days.      sildenafiL (VIAGRA) 100 MG tablet Take 100 mg by mouth daily as needed.      sumatriptan (IMITREX) 25 MG Tab TAKE 1 TABLET BY MOUTH AT LEAST 2 HOURS BETWEEN DOSES AS NEEDED TWICE DAILY         Past Surgical History:   Procedure Laterality Date    APPENDECTOMY      CHOLECYSTECTOMY      COLONOSCOPY N/A 12/15/2023    Procedure: COLONOSCOPY;  Surgeon: Kavita Manriquez MD;  Location: [facility];  Service: Gastroenterology;  Laterality: N/A;    COLONOSCOPY W/ POLYPECTOMY  12/15/2023    KNEE ARTHROSCOPY Bilateral     TOTAL KNEE ARTHROPLASTY Right 03/11/2021    Dr. Kennedy Melendez       Social History     Socioeconomic History    Marital status:    Tobacco Use    Smoking status: Former     Types: Cigarettes    Smokeless tobacco: Never   Substance and Sexual Activity    Alcohol use: Not Currently     Comment: socially    Drug  use: Never    Sexual activity: Never   Social History Narrative    ** Merged History Encounter **         ** Merged History Encounter **         ** Merged History Encounter **              OBJECTIVE:     Vital Signs Range (Last 24H):  Temp:  [36.6 °C (97.9 °F)] 36.6 °C (97.9 °F)  Pulse:  [78] 78  Resp:  [16] 16  SpO2:  [100 %] 100 %    Significant Labs:  Lab Results   Component Value Date    WBC 6.27 07/17/2024    HGB 14.3 07/17/2024    HCT 41.2 07/17/2024     07/17/2024    ALT 31 04/16/2024    AST 24 04/16/2024     07/17/2024    K 3.5 07/17/2024     07/17/2024    CREATININE 1.5 (H) 07/17/2024    BUN 15 07/17/2024    CO2 27 07/17/2024    INR 1.0 03/15/2021    HGBA1C 6.2 (H) 03/16/2021       Diagnostic Studies:    EKG:   Results for orders placed or performed during the hospital encounter of 04/16/24   EKG 12-lead    Collection Time: 04/16/24  6:41 AM   Result Value Ref Range    QRS Duration 84 ms    [facility] QTC Calculation 487 ms    Narrative    Test Reason : R06.02,    Vent. Rate : 094 BPM     Atrial Rate : 094 BPM     P-R Int : 160 ms          QRS Dur : 084 ms      QT Int : 390 ms       P-R-T Axes : 058 -32 066 degrees     QTc Int : 487 ms    Sinus rhythm with Premature atrial complexes with Aberrant conduction  Left axis deviation  Cannot rule out Anterior infarct (cited on or before 07-FEB-2024)  Abnormal ECG  When compared with ECG of 07-FEB-2024 09:19,  Aberrant conduction is now Present  QT has lengthened  Confirmed by Milady QUINTEROS, Baldemar AVERY ([id number]) on 4/16/2024 4:38:24 PM    Referred By:             Confirmed By:Baldemar Henning MD       2D ECHO:  TTE:  No results found for this or any previous visit.  Results for orders placed or performed during the hospital encounter of 03/15/21   Echo Color Flow Doppler? Yes   Result Value Ref Range    BSA 2.63 m2    AORTIC VALVE CUSP SEPERATION 2.08 cm    PV PEAK VELOCITY 0.75 cm/s    LVIDd 5.69 3.5 - 6.0 cm    IVS 1.22 (A) 0.6 - 1.1 cm    Posterior Wall 1.13  (A) 0.6 - 1.1 cm    Ao root annulus 3.60 cm    LVIDs 3.14 2.1 - 4.0 cm    FS 45 28 - 44 %    LV mass 279.73 g    LA size 2.78 cm    RVDD 2.68 cm    Left Ventricle Relative Wall Thickness 0.40 cm    MV valve area p 1/2 method 5.12 cm2    E/A ratio 0.79     E wave deceleration time 184.63 msec    LVOT diameter 2.45 cm    LVOT area 4.7 cm2    Ao peak santos 1.55 m/s    AV peak gradient 10 mmHg    MV Peak E Santos 0.53 m/s    MV stenosis pressure 1/2 time 42.99 ms    MV Peak A Santos 0.67 m/s    LV Systolic Volume 39.06 mL    LV Systolic Volume Index 15.4 mL/m2    LV Diastolic Volume 159.46 mL    LV Diastolic Volume Index 62.78 mL/m2    LV Mass Index 110 g/m2    Right Atrial Pressure (from IVC) 3 mmHg    Narrative    · Normal systolic function. The estimated ejection fraction is 60%  · Normal left ventricular diastolic function.  · Mild aortic regurgitation.  · Mild mitral regurgitation.  · Mild tricuspid regurgitation.  · Normal central venous pressure (3 mmHg).              Pre-op Assessment    I have reviewed the Patient Summary Reports.     I have reviewed the Nursing Notes. I have reviewed the NPO Status.   I have reviewed the Medications.     Review of Systems  Anesthesia Hx:   History of prior surgery of interest to airway management or planning:            Denies Personal Hx of Anesthesia complications.                    Social:  Former Smoker       EENT/Dental:   Right submandibular gland swelling           Cardiovascular:     Hypertension, poorly controlled               Clonidine patch - plan to remove during surgery and pt to reapply at home.                    Hypertension         Pulmonary:        Sleep Apnea (Likely, never diagnosed), CPAP           Education provided regarding risk of obstructive sleep apnea            Hepatic/GI:     GERD Denies Liver Disease.            Musculoskeletal:  Arthritis        Arthritis          Neurological:    Neuromuscular Disease,  Headaches      Dx of Headaches   Arthritis                          Neuromuscular Disease   Endocrine:  Diabetes Denies Hypothyroidism.  Denies Hyperthyroidism.  Diabetes                    Obesity / BMI > 30      Physical Exam  General: Well nourished, Cooperative, Alert and Oriented    Airway:  Mallampati: IV / III  Mouth Opening: Normal  TM Distance: 4 - 6 cm  Tongue: Large  Neck ROM: Normal ROM  Neck: Girth Increased    Dental:  Periodontal disease        Anesthesia Plan  Type of Anesthesia, risks & benefits discussed:    Anesthesia Type: Gen ETT  Intra-op Monitoring Plan: Standard ASA Monitors  Post Op Pain Control Plan: multimodal analgesia and IV/PO Opioids PRN  Induction:  IV and rapid sequence  Airway Plan: Video, Post-Induction with ramp  Informed Consent: Informed consent signed with the Patient and all parties understand the risks and agree with anesthesia plan.  All questions answered.   ASA Score: 3  Day of Surgery Review of History & Physical: H&P Update referred to the surgeon/provider.  Anesthesia Plan Notes:     Ramp - preoxygenate for 5 mins.     Denies headache, chest pain, or shortness of breath.     Ready For Surgery From Anesthesia Perspective.     .

## 2024-07-18 NOTE — DISCHARGE INSTRUCTIONS
"POSTOP PAROTIDECTOMY INSTRUCTIONS    DIET:  Eat lightly today drinking plenty of fluids advancing to a regular diet as tolerated    ACTIVITY:  Refrain from any strenuous activity for the next week.  No lifting > 20 lbs this week or > 40 lbs next week.    WOUND CARE:  Shower normally starting in 24 hours washing the area of skin glue last and patting it dry 1st.  Do not soak in the tub.  Do not peel glue off.  It will start to become rubbery and be able to be rubbed off in the course of the next 2 weeks.  The longer it stays in place the less scarring will occur.  Watch for signs of infection including redness, increasing swelling and tenderness, fever or cloudy drainage.      DRAIN MANAGEMENT:  Empty the drain as shown as needed.  Keep at least partially collapsed to keep the drain under suction.  The drain can be removed when the drainage is no longer frankly bloody (it will go from bloody to pink to yellow) and when there has been LESS than 30 ml (1 ounce) of drainage in the previous 24 hours.  Call to reschedule drain removal if these criteria are not met.  Call if there is continuous blood filling the drain bulb > 100 ml in < 3 hours or any concerns.      MEDICATIONS:  See medication reconciliation.  If not already on anti-inflammatory drugs ibuprofen or naproxen can be taken over-the-counter.  Tylenol can be taken over-the-counter as well in between to spread the doses out throughout the day.  Take any narcotic pain medication prescribed only on a "breakthrough" basis; meaning as needed IN ADDITION to Tyenol or Ibuprofen. Take antibiotics as/if prescribed.    CONTACT PHYSICIAN:  Call with temperature greater than 100.5 signs of infection as above or any other concern.    FOLLOW-UP:  As scheduled 2-3 days for drain removal.  Sooner with any concerns.         "

## 2024-07-18 NOTE — ANESTHESIA PROCEDURE NOTES
Intubation    Date/Time: 7/18/2024 12:57 PM    Performed by: Eleni Aguilera CRNA  Authorized by: Sebastian Cruz MD    Intubation:     Induction:  Intravenous    Intubated:  Postinduction    Mask Ventilation:  Easy with oral airway    Attempts:  1    Attempted By:  CRNA    Method of Intubation:  Video laryngoscopy    Blade:  Keys 3    Laryngeal View Grade: Grade I - full view of cords      Difficult Airway Encountered?: No      Complications:  None    Airway Device:  Oral endotracheal tube    Airway Device Size:  8.0    Style/Cuff Inflation:  Cuffed    Inflation Amount (mL):  10    Tube secured:  23    Secured at:  The lips    Placement Verified By:  Capnometry    Complicating Factors:  None    Findings Post-Intubation:  BS equal bilateral

## 2024-07-18 NOTE — DISCHARGE SUMMARY
[facility]  Discharge Note  Short Stay    Procedure(s) (LRB):  EXCISION, SUBMANDIBULAR GLAND (Right)      OUTCOME: Patient tolerated treatment/procedure well without complication and is now ready for discharge.    DISPOSITION: Home or Self Care    FINAL DIAGNOSIS:  Chronic/recurrent right submandibular gland infection (sialadenitis)    FOLLOWUP: Monday at [facility] for drain removal    DISCHARGE INSTRUCTIONS:      POSTOP PAROTIDECTOMY INSTRUCTIONS    DIET:  Eat lightly today drinking plenty of fluids advancing to a regular diet as tolerated    ACTIVITY:  Refrain from any strenuous activity for the next week.  No lifting > 20 lbs this week or > 40 lbs next week.    WOUND CARE:  Shower normally starting in 24 hours washing the area of skin glue last and patting it dry 1st.  Do not soak in the tub.  Do not peel glue off.  It will start to become rubbery and be able to be rubbed off in the course of the next 2 weeks.  The longer it stays in place the less scarring will occur.  Watch for signs of infection including redness, increasing swelling and tenderness, fever or cloudy drainage.      DRAIN MANAGEMENT:  Empty the drain as shown as needed.  Keep at least partially collapsed to keep the drain under suction.  The drain can be removed when the drainage is no longer frankly bloody (it will go from bloody to pink to yellow) and when there has been LESS than 30 ml (1 ounce) of drainage in the previous 24 hours.  Call to reschedule drain removal if these criteria are not met.  Call if there is continuous blood filling the drain bulb > 100 ml in < 3 hours or any concerns.      MEDICATIONS:  See medication reconciliation.  If not already on anti-inflammatory drugs ibuprofen or naproxen can be taken over-the-counter.  Tylenol can be taken over-the-counter as well in between to spread the doses out throughout the day.  Take any narcotic pain medication prescribed only on a  ""breakthrough" basis; meaning as needed IN ADDITION to Tyenol or Ibuprofen. Take antibiotics as/if prescribed.    CONTACT PHYSICIAN:  Call with temperature greater than 100.5 signs of infection as above or any other concern.    FOLLOW-UP:  As scheduled 2-3 days for drain removal.  Sooner with any concerns.           Discharge Procedure Orders   Notify your health care provider if you experience any of the following:  temperature >100.4     Notify your health care provider if you experience any of the following:  persistent nausea and vomiting or diarrhea     Notify your health care provider if you experience any of the following:  severe uncontrolled pain     Notify your health care provider if you experience any of the following:  redness, tenderness, or signs of infection (pain, swelling, redness, odor or green/yellow discharge around incision site)     Notify your health care provider if you experience any of the following:  difficulty breathing or increased cough        TIME SPENT ON DISCHARGE: 10 minutes  "

## 2024-07-18 NOTE — ANESTHESIA PROCEDURE NOTES
Intubation    Date/Time: 7/18/2024 12:57 PM    Performed by: Eleni Aguilera CRNA  Authorized by: Sebastian Cruz MD    Intubation:     Induction:  Intravenous    Intubated:  Postinduction    Mask Ventilation:  Moderately difficult with oral airway    Attempts:  1    Attempted By:  CRNA    Method of Intubation:  Video laryngoscopy    Blade:  Keys 3    Laryngeal View Grade: Grade IIA - cords partially seen      Difficult Airway Encountered?: No      Complications:  None    Airway Device:  Oral endotracheal tube    Airway Device Size:  8.0    Style/Cuff Inflation:  Cuffed (inflated to minimal occlusive pressure)    Tube secured:  26    Secured at:  The teeth    Placement Verified By:  Capnometry    Complicating Factors:  None    Findings Post-Intubation:  BS equal bilateral

## 2024-07-18 NOTE — PLAN OF CARE
Pt in preop bay 40, VSS, meds given and IV inserted. Pt denies any open wounds on body or the use of any weight loss injections. Pt needs anesthesia consents signed, otherwise ready to roll.  Procedural consents verified with pt.

## 2024-07-18 NOTE — PLAN OF CARE
Patient arrived to PACU room 18, post sedation.  VSS, AZALIA drain noted to right neck.  Incision closed with dermabond.  OPA in place, on 6L/simple mask at present time. No acute distress noted.

## 2024-07-22 ENCOUNTER — OFFICE VISIT (OUTPATIENT)
Dept: OTOLARYNGOLOGY | Facility: CLINIC | Age: 44
End: 2024-07-22
Payer: MEDICAID

## 2024-07-22 DIAGNOSIS — K11.20 SIALADENITIS: Primary | ICD-10-CM

## 2024-07-22 PROCEDURE — 99213 OFFICE O/P EST LOW 20 MIN: CPT | Mod: PBBFAC | Performed by: OTOLARYNGOLOGY

## 2024-07-22 PROCEDURE — 99999 PR PBB SHADOW E&M-EST. PATIENT-LVL III: CPT | Mod: PBBFAC,,, | Performed by: OTOLARYNGOLOGY

## 2024-07-22 PROCEDURE — 1159F MED LIST DOCD IN RCRD: CPT | Mod: CPTII,,, | Performed by: OTOLARYNGOLOGY

## 2024-07-22 PROCEDURE — 99024 POSTOP FOLLOW-UP VISIT: CPT | Mod: ,,, | Performed by: OTOLARYNGOLOGY

## 2024-07-22 NOTE — PROGRESS NOTES
[facility]  POSTOPERATIVE VISIT NOTE    Subjective:      Patient: Faraz Cole Patient PCP: Rigo Staples MD         :  1980     Sex:  male      MRN:  [id number]          Date of Visit: 2024      Chief Complaint/Narrative: Follow-up (Removal of drain)      Subjective:  Status post right submandibular gland excision for recurrent/chronic infection Thursday last week.  Drain now putting out less than 25 mL in a 24 hour period and clearing.  No fevers or chills.  Tolerating amoxicillin.  Wants to returned to work.  Understands weight restrictions.  Pathology pending.    All medications, allergies, and past history have been reviewed.    Objective:      Vitals:      7/15/2024     1:07 PM 2024    10:45 AM 2024     9:03 AM   Vitals - 1 value per visit   SYSTOLIC 193 188    DIASTOLIC 139 113    Pulse 84 78    Temp  97.9 °F (36.6 °C)    Resp  16    SPO2  100 %    Weight (lb) 317.46     Weight (kg) 144     Pain Score Seven  Zero       There is no height or weight on file to calculate BSA.    Physical Exam:    Drain removed.  Healing well.  Glue intact.  No fluid collection or signs of cellulitis.  Facial nerve function intact bilaterally with no asymmetry of the corner of the mouth/marginal mandibular nerve function.      Procedure(s):  None      Assessment:      Problem List Items Addressed This Visit          ENT    Sialadenitis - Primary    Overview     RIGHT Submandibular, chronic/recurrent                 Plan/recommendations:      Discontinue antibiotics.  Okay to returned to work tomorrow.  As previously outlined no lifting more than 20 lb until Friday and no lifting more than 40 lb until the following Friday (2 weeks postop) at which point there are no restrictions.      Patient to contact the office if there is increasing swelling, cloudy drainage or any other concerning findings.    Aftercare of the glue as discussed shower washing it last and drying it 1st.  Rather off when it  becomes soft but do not peel it off.  If there are any concerns return for follow-up.

## 2024-07-22 NOTE — PATIENT INSTRUCTIONS
Discontinue antibiotics.  Okay to returned to work tomorrow.  As previously outlined no lifting more than 20 lb until Friday and no lifting more than 40 lb until the following Friday (2 weeks postop) at which point there are no restrictions.      Patient to contact the office if there is increasing swelling, cloudy drainage or any other concerning findings.    Aftercare of the glue as discussed shower washing it last and drying it 1st.  Rather off when it becomes soft but do not peel it off.  If there are any concerns return for follow-up.        Voice recognition software was used in the creation of this note/communication and any sound-alike errors which may have occurred from its use should be taken in context when interpreting.  If such errors prevent a clear understanding of the note/communication, please contact the office for clarification.

## 2024-07-23 LAB
FINAL PATHOLOGIC DIAGNOSIS: NORMAL
GROSS: NORMAL
Lab: NORMAL

## 2024-07-24 ENCOUNTER — PATIENT MESSAGE (OUTPATIENT)
Dept: OTOLARYNGOLOGY | Facility: CLINIC | Age: 44
End: 2024-07-24
Payer: MEDICAID

## 2024-09-23 NOTE — PROGRESS NOTES
"Subjective:      Faraz Cole is a 44 y.o. male who returns today regarding his ED.    Established pt of Dr. Rushing; new to me today     Erectile Dysfunction  Reports chronic ED refractory to oral agents. Patient complains of erectile dysfunction.  Patient states the nature of difficulty is both attaining and maintaining erection. Full erections occur never. Partial erections occur with masturbation. Libido is affected. Risk factors for ED include diabetes mellitus and antihypertensive medications.  Previous treatment of ED includes Viagra, Cialis and trimix-- requesting rx for trimix     He also reports daily fatigue, low libido and difficulty losing weight  BMI: 39.35     The following portions of the patient's history were reviewed and updated as appropriate: allergies, current medications, past family history, past medical history, past social history, past surgical history and problem list.    Review of Systems  A comprehensive multipoint review of systems was negative except as otherwise stated in the HPI.     Objective:   Vitals: BP (!) 187/111 (BP Location: Left arm, Patient Position: Sitting, BP Method: Large (Automatic))   Pulse 84   Ht 6' 4" (1.93 m)   Wt (!) 146.6 kg (323 lb 4.9 oz)   BMI 39.35 kg/m²     Physical Exam   General: alert and oriented, no acute distress  Respiratory: Symmetric expansion, non-labored breathing  Cardiovascular: regular rate and rhythm, no peripheral edema  Abdomen: soft, non distended  Skin: normal coloration and turgor, no rashes, no suspicious skin lesions noted  Neuro: no gross deficits  Psych: normal judgment and insight, normal mood/affect, and non-anxious    Lab Review   Urinalysis demonstrates no ua   Lab Results   Component Value Date    WBC 6.27 07/17/2024    HGB 14.3 07/17/2024    HCT 41.2 07/17/2024    MCV 85 07/17/2024     07/17/2024     Lab Results   Component Value Date    CREATININE 1.5 (H) 07/17/2024    BUN 15 07/17/2024           Assessment and " Plan:   1. Encounter for prostate cancer screening  - Prostate Specific Antigen, Diagnostic; Future    2. Erectile dysfunction due to diseases classified elsewhere  - papaverine 30 mg/mL injection; Add phentolamine 10 mgs/ml., add PGE1  100 micrograms.  Dispense: 10 mL; Refill: 12    3. Chronic fatigue  4. Low libido  --patient is interested in TRT  - Testosterone; Future  - PROLACTIN; Future       --will notify with results via phone     This note is dictated on M*Modal word recognition program.  There are word recognition mistakes that are occasionally missed on review.

## 2024-09-24 ENCOUNTER — OFFICE VISIT (OUTPATIENT)
Dept: UROLOGY | Facility: CLINIC | Age: 44
End: 2024-09-24
Payer: MEDICAID

## 2024-09-24 VITALS
DIASTOLIC BLOOD PRESSURE: 111 MMHG | WEIGHT: 315 LBS | HEIGHT: 76 IN | BODY MASS INDEX: 38.36 KG/M2 | HEART RATE: 84 BPM | SYSTOLIC BLOOD PRESSURE: 187 MMHG

## 2024-09-24 DIAGNOSIS — R53.82 CHRONIC FATIGUE: ICD-10-CM

## 2024-09-24 DIAGNOSIS — N52.1 ERECTILE DYSFUNCTION DUE TO DISEASES CLASSIFIED ELSEWHERE: ICD-10-CM

## 2024-09-24 DIAGNOSIS — R68.82 LOW LIBIDO: ICD-10-CM

## 2024-09-24 DIAGNOSIS — Z12.5 ENCOUNTER FOR PROSTATE CANCER SCREENING: Primary | ICD-10-CM

## 2024-09-24 PROCEDURE — 3080F DIAST BP >= 90 MM HG: CPT | Mod: CPTII,,, | Performed by: NURSE PRACTITIONER

## 2024-09-24 PROCEDURE — 1159F MED LIST DOCD IN RCRD: CPT | Mod: CPTII,,, | Performed by: NURSE PRACTITIONER

## 2024-09-24 PROCEDURE — 99214 OFFICE O/P EST MOD 30 MIN: CPT | Mod: PBBFAC,PN | Performed by: NURSE PRACTITIONER

## 2024-09-24 PROCEDURE — 1160F RVW MEDS BY RX/DR IN RCRD: CPT | Mod: CPTII,,, | Performed by: NURSE PRACTITIONER

## 2024-09-24 PROCEDURE — 99999 PR PBB SHADOW E&M-EST. PATIENT-LVL IV: CPT | Mod: PBBFAC,,, | Performed by: NURSE PRACTITIONER

## 2024-09-24 PROCEDURE — 99214 OFFICE O/P EST MOD 30 MIN: CPT | Mod: S$PBB,,, | Performed by: NURSE PRACTITIONER

## 2024-09-24 PROCEDURE — 3008F BODY MASS INDEX DOCD: CPT | Mod: CPTII,,, | Performed by: NURSE PRACTITIONER

## 2024-09-24 PROCEDURE — 3077F SYST BP >= 140 MM HG: CPT | Mod: CPTII,,, | Performed by: NURSE PRACTITIONER

## 2024-09-24 RX ORDER — PAPAVERINE HYDROCHLORIDE 30 MG/ML
INJECTION INTRAMUSCULAR; INTRAVENOUS
Qty: 10 ML | Refills: 12 | Status: SHIPPED | OUTPATIENT
Start: 2024-09-24

## 2024-09-24 NOTE — LETTER
September 24, 2024      [facility] [address]  [facility] [address]  Phone: [phone]  Fax: [phone]       Patient: Faraz Cole   YOB: 1980  Date of Visit: 09/24/2024    To Whom It May Concern:    Adrian Cole  was at [facility] on 09/24/2024. The patient may return to work on 09/24/2024 with no restrictions. If you have any questions or concerns, or if I can be of further assistance, please do not hesitate to contact me.    Sincerely,    Ashlie Pa LPN

## 2025-04-24 ENCOUNTER — NURSE TRIAGE (OUTPATIENT)
Dept: ADMINISTRATIVE | Facility: CLINIC | Age: 45
End: 2025-04-24
Payer: MEDICAID

## 2025-04-24 NOTE — TELEPHONE ENCOUNTER
OOC RN  Patient calling  DM and wants referral to podiatrist.   Been picking on it, cutting on foot.  Black spot in center. Podiatrist he was seeing is not longer there.   And needs a new one,  and states he has bone sticking out of left  foot where it bends.  Dr. Greyson Fuentes referred  Who is no longer in practice.   Neuro in tact.  [facility].    Reason for Disposition   Patient sounds very sick or weak to the triager    Additional Information   Negative: Entire foot is cool or blue in comparison to other foot   Negative: Purple or black skin on foot or toe   Negative: Red area or streak and fever   Negative: Swollen foot and fever    Protocols used: Foot Pain-A-[place]

## (undated) DEVICE — SYR LUER LOCK STERILE 10ML

## (undated) DEVICE — DRAIN CHANNEL ROUND 10FR

## (undated) DEVICE — NDL BLUNT W/O FILTER 18GX1.5IN

## (undated) DEVICE — SPONGE GAUZE 16PLY 4X4

## (undated) DEVICE — SUT 3-0 VICRYL SH CR/8 18

## (undated) DEVICE — ELECTRODE REM PLYHSV RETURN 9

## (undated) DEVICE — SYR BULB EAR/ULCER STER 3OZ

## (undated) DEVICE — DRAPE STERI-DRAPE 1000 17X11IN

## (undated) DEVICE — ELECTRODE CAUTER TIP 2.75IN

## (undated) DEVICE — SEALER LIGASURE CRV 18.8CM

## (undated) DEVICE — ADHESIVE MASTISOL VIAL 48/BX

## (undated) DEVICE — GAUZE SPONGE PEANUT STRL

## (undated) DEVICE — SCRUB 10% POVIDONE IODINE 4OZ

## (undated) DEVICE — BLADE SURG #15 CARBON STEEL

## (undated) DEVICE — PENCIL ROCKER SWITCH 10FT CORD

## (undated) DEVICE — SUT 3-0 VICRYL / LIGAPACK

## (undated) DEVICE — EVACUATOR WOUND BULB 100CC

## (undated) DEVICE — SOL IRR SOD CHL .9% POUR

## (undated) DEVICE — DRAPE MEDIUM SHEET 40X70IN

## (undated) DEVICE — UNDERGLOVES BIOGEL PI SIZE 8

## (undated) DEVICE — SUT CTD VICRYL 3-0 UND BR

## (undated) DEVICE — TUBE CONNECTING 3/16INX6FT

## (undated) DEVICE — COVER SURG LIGHT HANDLE

## (undated) DEVICE — BLANKET FULL BODY 85.8X50IN

## (undated) DEVICE — SUT MONOCRYL 5-0 P-3 UND 18

## (undated) DEVICE — SYS LABEL CORRECT MED

## (undated) DEVICE — CORD BIPOLAR 12 FOOT

## (undated) DEVICE — TRAY SKIN SCRUB DRY PREMIUM

## (undated) DEVICE — Device

## (undated) DEVICE — SPONGE LAP 18X18 PREWASHED

## (undated) DEVICE — SOL POVIDONE SCRUB IODINE 4 OZ

## (undated) DEVICE — NDL 27G X 1 1/4

## (undated) DEVICE — PAD CURAD NONADH 3X4IN

## (undated) DEVICE — GLOVE BIOGEL PI MICRO SZ 8

## (undated) DEVICE — PACK EENT SURG II ECLIPSE

## (undated) DEVICE — DRESSING TRANS 4X4 TEGADERM

## (undated) DEVICE — ADHESIVE DERMABOND ADVANCED